# Patient Record
Sex: FEMALE | Race: BLACK OR AFRICAN AMERICAN | Employment: PART TIME | URBAN - METROPOLITAN AREA
[De-identification: names, ages, dates, MRNs, and addresses within clinical notes are randomized per-mention and may not be internally consistent; named-entity substitution may affect disease eponyms.]

---

## 2018-12-08 ENCOUNTER — HOSPITAL ENCOUNTER (EMERGENCY)
Age: 28
Discharge: HOME | End: 2018-12-08
Attending: EMERGENCY MEDICINE

## 2018-12-08 ENCOUNTER — APPOINTMENT (OUTPATIENT)
Dept: ULTRASOUND IMAGING | Age: 28
End: 2018-12-08

## 2018-12-08 VITALS
TEMPERATURE: 98.2 F | SYSTOLIC BLOOD PRESSURE: 114 MMHG | RESPIRATION RATE: 14 BRPM | DIASTOLIC BLOOD PRESSURE: 66 MMHG | HEIGHT: 66 IN | BODY MASS INDEX: 17.68 KG/M2 | OXYGEN SATURATION: 100 % | WEIGHT: 110 LBS | HEART RATE: 70 BPM

## 2018-12-08 DIAGNOSIS — K29.00 ACUTE GASTRITIS WITHOUT HEMORRHAGE, UNSPECIFIED GASTRITIS TYPE: Primary | ICD-10-CM

## 2018-12-08 DIAGNOSIS — O26.891 ABDOMINAL PAIN DURING PREGNANCY IN FIRST TRIMESTER: ICD-10-CM

## 2018-12-08 DIAGNOSIS — R10.9 ABDOMINAL PAIN DURING PREGNANCY IN FIRST TRIMESTER: ICD-10-CM

## 2018-12-08 LAB
ALBUMIN SERPL-MCNC: 4.6 G/DL (ref 3.5–5.2)
ALP BLD-CCNC: 84 U/L (ref 35–104)
ALT SERPL-CCNC: 10 U/L (ref 0–32)
ANION GAP SERPL CALCULATED.3IONS-SCNC: 20 MMOL/L (ref 7–16)
AST SERPL-CCNC: 22 U/L (ref 0–31)
BACTERIA: NORMAL /HPF
BASOPHILS ABSOLUTE: 0.02 E9/L (ref 0–0.2)
BASOPHILS RELATIVE PERCENT: 0.3 % (ref 0–2)
BILIRUB SERPL-MCNC: 0.4 MG/DL (ref 0–1.2)
BILIRUBIN URINE: NEGATIVE
BLOOD, URINE: ABNORMAL
BUN BLDV-MCNC: 15 MG/DL (ref 6–20)
CALCIUM SERPL-MCNC: 9.7 MG/DL (ref 8.6–10.2)
CHLORIDE BLD-SCNC: 94 MMOL/L (ref 98–107)
CHP ED QC CHECK: NORMAL
CLARITY: ABNORMAL
CO2: 19 MMOL/L (ref 22–29)
COLOR: YELLOW
CREAT SERPL-MCNC: 0.5 MG/DL (ref 0.5–1)
EOSINOPHILS ABSOLUTE: 0.01 E9/L (ref 0.05–0.5)
EOSINOPHILS RELATIVE PERCENT: 0.1 % (ref 0–6)
EPITHELIAL CELLS, UA: NORMAL /HPF
GFR AFRICAN AMERICAN: >60
GFR NON-AFRICAN AMERICAN: >60 ML/MIN/1.73
GLUCOSE BLD-MCNC: 64 MG/DL (ref 74–99)
GLUCOSE URINE: NEGATIVE MG/DL
GONADOTROPIN, CHORIONIC (HCG) QUANT: ABNORMAL MIU/ML
HCT VFR BLD CALC: 37 % (ref 34–48)
HEMOGLOBIN: 12.3 G/DL (ref 11.5–15.5)
IMMATURE GRANULOCYTES #: 0.02 E9/L
IMMATURE GRANULOCYTES %: 0.3 % (ref 0–5)
KETONES, URINE: >=80 MG/DL
LACTIC ACID: 2.2 MMOL/L (ref 0.5–2.2)
LEUKOCYTE ESTERASE, URINE: NEGATIVE
LIPASE: 21 U/L (ref 13–60)
LYMPHOCYTES ABSOLUTE: 1.83 E9/L (ref 1.5–4)
LYMPHOCYTES RELATIVE PERCENT: 27.2 % (ref 20–42)
MCH RBC QN AUTO: 27.2 PG (ref 26–35)
MCHC RBC AUTO-ENTMCNC: 33.2 % (ref 32–34.5)
MCV RBC AUTO: 81.7 FL (ref 80–99.9)
MONOCYTES ABSOLUTE: 0.42 E9/L (ref 0.1–0.95)
MONOCYTES RELATIVE PERCENT: 6.2 % (ref 2–12)
NEUTROPHILS ABSOLUTE: 4.43 E9/L (ref 1.8–7.3)
NEUTROPHILS RELATIVE PERCENT: 65.9 % (ref 43–80)
NITRITE, URINE: NEGATIVE
PDW BLD-RTO: 14.7 FL (ref 11.5–15)
PH UA: 6 (ref 5–9)
PLATELET # BLD: 333 E9/L (ref 130–450)
PMV BLD AUTO: 9.8 FL (ref 7–12)
POTASSIUM SERPL-SCNC: 3.7 MMOL/L (ref 3.5–5)
PREGNANCY TEST URINE, POC: POSITIVE
PROTEIN UA: ABNORMAL MG/DL
RBC # BLD: 4.53 E12/L (ref 3.5–5.5)
RBC UA: NORMAL /HPF (ref 0–2)
SODIUM BLD-SCNC: 133 MMOL/L (ref 132–146)
SPECIFIC GRAVITY UA: >=1.03 (ref 1–1.03)
TOTAL PROTEIN: 8.7 G/DL (ref 6.4–8.3)
TRICHOMONAS: NORMAL /HPF
UROBILINOGEN, URINE: 0.2 E.U./DL
WBC # BLD: 6.7 E9/L (ref 4.5–11.5)
WBC UA: NORMAL /HPF (ref 0–5)

## 2018-12-08 PROCEDURE — 85025 COMPLETE CBC W/AUTO DIFF WBC: CPT

## 2018-12-08 PROCEDURE — 6360000002 HC RX W HCPCS: Performed by: PREVENTIVE MEDICINE

## 2018-12-08 PROCEDURE — 83605 ASSAY OF LACTIC ACID: CPT

## 2018-12-08 PROCEDURE — 2580000003 HC RX 258: Performed by: PREVENTIVE MEDICINE

## 2018-12-08 PROCEDURE — 80053 COMPREHEN METABOLIC PANEL: CPT

## 2018-12-08 PROCEDURE — 6370000000 HC RX 637 (ALT 250 FOR IP): Performed by: PREVENTIVE MEDICINE

## 2018-12-08 PROCEDURE — 96375 TX/PRO/DX INJ NEW DRUG ADDON: CPT

## 2018-12-08 PROCEDURE — 76801 OB US < 14 WKS SINGLE FETUS: CPT

## 2018-12-08 PROCEDURE — 83690 ASSAY OF LIPASE: CPT

## 2018-12-08 PROCEDURE — 84702 CHORIONIC GONADOTROPIN TEST: CPT

## 2018-12-08 PROCEDURE — 96374 THER/PROPH/DIAG INJ IV PUSH: CPT

## 2018-12-08 PROCEDURE — S0028 INJECTION, FAMOTIDINE, 20 MG: HCPCS | Performed by: PREVENTIVE MEDICINE

## 2018-12-08 PROCEDURE — 99284 EMERGENCY DEPT VISIT MOD MDM: CPT

## 2018-12-08 PROCEDURE — 2500000003 HC RX 250 WO HCPCS: Performed by: PREVENTIVE MEDICINE

## 2018-12-08 PROCEDURE — 81001 URINALYSIS AUTO W/SCOPE: CPT

## 2018-12-08 RX ORDER — 0.9 % SODIUM CHLORIDE 0.9 %
1000 INTRAVENOUS SOLUTION INTRAVENOUS ONCE
Status: COMPLETED | OUTPATIENT
Start: 2018-12-08 | End: 2018-12-08

## 2018-12-08 RX ORDER — DEXTROSE MONOHYDRATE 25 G/50ML
25 INJECTION, SOLUTION INTRAVENOUS ONCE
Status: COMPLETED | OUTPATIENT
Start: 2018-12-08 | End: 2018-12-08

## 2018-12-08 RX ORDER — PRENATAL NO.42/FOLIC ACID 1.4 MG
1 TABLET CHEW,IMMED AND DELAY REL,BIPHASE ORAL DAILY
Qty: 30 TABLET | Refills: 0 | Status: SHIPPED | OUTPATIENT
Start: 2018-12-08 | End: 2019-01-07

## 2018-12-08 RX ORDER — METOCLOPRAMIDE HYDROCHLORIDE 5 MG/ML
10 INJECTION INTRAMUSCULAR; INTRAVENOUS ONCE
Status: COMPLETED | OUTPATIENT
Start: 2018-12-08 | End: 2018-12-08

## 2018-12-08 RX ADMIN — DEXTROSE MONOHYDRATE 25 G: 25 INJECTION, SOLUTION INTRAVENOUS at 16:05

## 2018-12-08 RX ADMIN — SODIUM CHLORIDE 1000 ML: 9 INJECTION, SOLUTION INTRAVENOUS at 16:05

## 2018-12-08 RX ADMIN — FAMOTIDINE 20 MG: 10 INJECTION, SOLUTION INTRAVENOUS at 14:52

## 2018-12-08 RX ADMIN — METOCLOPRAMIDE 10 MG: 5 INJECTION, SOLUTION INTRAMUSCULAR; INTRAVENOUS at 14:52

## 2018-12-08 RX ADMIN — LIDOCAINE HYDROCHLORIDE: 20 SOLUTION ORAL; TOPICAL at 14:52

## 2018-12-08 ASSESSMENT — ENCOUNTER SYMPTOMS
VOMITING: 1
SHORTNESS OF BREATH: 0
COUGH: 0
CONSTIPATION: 0
ALLERGIC/IMMUNOLOGIC NEGATIVE: 1
ABDOMINAL PAIN: 1
CHEST TIGHTNESS: 0
DIARRHEA: 0
NAUSEA: 1

## 2018-12-08 ASSESSMENT — PAIN DESCRIPTION - PAIN TYPE: TYPE: ACUTE PAIN

## 2018-12-08 ASSESSMENT — PAIN SCALES - GENERAL: PAINLEVEL_OUTOF10: 10

## 2018-12-08 ASSESSMENT — PAIN DESCRIPTION - LOCATION: LOCATION: ABDOMEN

## 2018-12-08 ASSESSMENT — PAIN DESCRIPTION - DESCRIPTORS: DESCRIPTORS: BURNING

## 2018-12-08 NOTE — ED PROVIDER NOTES
pregnancy in first trimester        Disposition:  Patient's disposition: Discharge to home  Patient's condition is stable.                   Sagrario Madera, DO  Resident  12/09/18 [id number]

## 2019-01-08 ENCOUNTER — HOSPITAL ENCOUNTER (EMERGENCY)
Age: 29
Discharge: HOME | End: 2019-01-08
Attending: EMERGENCY MEDICINE
Payer: MEDICAID

## 2019-01-08 ENCOUNTER — APPOINTMENT (OUTPATIENT)
Dept: ULTRASOUND IMAGING | Age: 29
End: 2019-01-08
Payer: MEDICAID

## 2019-01-08 VITALS
BODY MASS INDEX: 17.68 KG/M2 | DIASTOLIC BLOOD PRESSURE: 71 MMHG | SYSTOLIC BLOOD PRESSURE: 117 MMHG | WEIGHT: 110 LBS | HEIGHT: 66 IN | HEART RATE: 80 BPM | RESPIRATION RATE: 14 BRPM | TEMPERATURE: 98.6 F | OXYGEN SATURATION: 100 %

## 2019-01-08 DIAGNOSIS — O03.9 SPONTANEOUS ABORTION: Primary | ICD-10-CM

## 2019-01-08 LAB
ABO/RH: NORMAL
ALBUMIN SERPL-MCNC: 3.4 G/DL (ref 3.5–5.2)
ALP BLD-CCNC: 64 U/L (ref 35–104)
ALT SERPL-CCNC: 8 U/L (ref 0–32)
ANION GAP SERPL CALCULATED.3IONS-SCNC: 8 MMOL/L (ref 7–16)
AST SERPL-CCNC: 17 U/L (ref 0–31)
BASOPHILS ABSOLUTE: 0.02 E9/L (ref 0–0.2)
BASOPHILS RELATIVE PERCENT: 0.3 % (ref 0–2)
BILIRUB SERPL-MCNC: 0.2 MG/DL (ref 0–1.2)
BUN BLDV-MCNC: 8 MG/DL (ref 6–20)
CALCIUM SERPL-MCNC: 8.6 MG/DL (ref 8.6–10.2)
CHLORIDE BLD-SCNC: 102 MMOL/L (ref 98–107)
CO2: 22 MMOL/L (ref 22–29)
CREAT SERPL-MCNC: 0.5 MG/DL (ref 0.5–1)
EOSINOPHILS ABSOLUTE: 0.02 E9/L (ref 0.05–0.5)
EOSINOPHILS RELATIVE PERCENT: 0.3 % (ref 0–6)
GFR AFRICAN AMERICAN: >60
GFR NON-AFRICAN AMERICAN: >60 ML/MIN/1.73
GLUCOSE BLD-MCNC: 80 MG/DL (ref 74–99)
HCT VFR BLD CALC: 29.2 % (ref 34–48)
HEMOGLOBIN: 10.2 G/DL (ref 11.5–15.5)
IMMATURE GRANULOCYTES #: 0.03 E9/L
IMMATURE GRANULOCYTES %: 0.4 % (ref 0–5)
LYMPHOCYTES ABSOLUTE: 1.31 E9/L (ref 1.5–4)
LYMPHOCYTES RELATIVE PERCENT: 18.3 % (ref 20–42)
MCH RBC QN AUTO: 28.6 PG (ref 26–35)
MCHC RBC AUTO-ENTMCNC: 34.9 % (ref 32–34.5)
MCV RBC AUTO: 81.8 FL (ref 80–99.9)
MONOCYTES ABSOLUTE: 0.38 E9/L (ref 0.1–0.95)
MONOCYTES RELATIVE PERCENT: 5.3 % (ref 2–12)
NEUTROPHILS ABSOLUTE: 5.39 E9/L (ref 1.8–7.3)
NEUTROPHILS RELATIVE PERCENT: 75.4 % (ref 43–80)
PDW BLD-RTO: 14.7 FL (ref 11.5–15)
PLATELET # BLD: 254 E9/L (ref 130–450)
PMV BLD AUTO: 9.9 FL (ref 7–12)
POTASSIUM SERPL-SCNC: 3.2 MMOL/L (ref 3.5–5)
RBC # BLD: 3.57 E12/L (ref 3.5–5.5)
SODIUM BLD-SCNC: 132 MMOL/L (ref 132–146)
TOTAL PROTEIN: 6.9 G/DL (ref 6.4–8.3)
WBC # BLD: 7.2 E9/L (ref 4.5–11.5)

## 2019-01-08 PROCEDURE — 99284 EMERGENCY DEPT VISIT MOD MDM: CPT

## 2019-01-08 PROCEDURE — 88300 SURGICAL PATH GROSS: CPT

## 2019-01-08 PROCEDURE — 86900 BLOOD TYPING SEROLOGIC ABO: CPT

## 2019-01-08 PROCEDURE — 80053 COMPREHEN METABOLIC PANEL: CPT

## 2019-01-08 PROCEDURE — 85025 COMPLETE CBC W/AUTO DIFF WBC: CPT

## 2019-01-08 PROCEDURE — 76801 OB US < 14 WKS SINGLE FETUS: CPT

## 2019-01-08 PROCEDURE — 86901 BLOOD TYPING SEROLOGIC RH(D): CPT

## 2019-01-08 RX ORDER — NAPROXEN 500 MG/1
500 TABLET ORAL 2 TIMES DAILY
Qty: 14 TABLET | Refills: 0 | Status: SHIPPED | OUTPATIENT
Start: 2019-01-08 | End: 2019-12-11

## 2019-01-08 RX ORDER — ONDANSETRON 8 MG/1
8 TABLET, ORALLY DISINTEGRATING ORAL EVERY 8 HOURS PRN
Qty: 10 TABLET | Refills: 1 | Status: SHIPPED | OUTPATIENT
Start: 2019-01-08 | End: 2019-12-11

## 2019-01-08 RX ORDER — 0.9 % SODIUM CHLORIDE 0.9 %
500 INTRAVENOUS SOLUTION INTRAVENOUS ONCE
Status: DISCONTINUED | OUTPATIENT
Start: 2019-01-08 | End: 2019-01-09 | Stop reason: HOSPADM

## 2019-01-08 RX ORDER — OXYCODONE HYDROCHLORIDE AND ACETAMINOPHEN 5; 325 MG/1; MG/1
1 TABLET ORAL EVERY 6 HOURS PRN
Qty: 6 TABLET | Refills: 0 | Status: SHIPPED | OUTPATIENT
Start: 2019-01-08 | End: 2019-01-11

## 2019-01-14 ENCOUNTER — HOSPITAL ENCOUNTER (EMERGENCY)
Age: 29
Discharge: HOME | End: 2019-01-14
Attending: EMERGENCY MEDICINE
Payer: MEDICAID

## 2019-01-14 ENCOUNTER — APPOINTMENT (OUTPATIENT)
Dept: ULTRASOUND IMAGING | Age: 29
End: 2019-01-14
Payer: MEDICAID

## 2019-01-14 VITALS
RESPIRATION RATE: 16 BRPM | TEMPERATURE: 98.3 F | OXYGEN SATURATION: 96 % | HEIGHT: 62 IN | BODY MASS INDEX: 22.45 KG/M2 | HEART RATE: 90 BPM | WEIGHT: 122 LBS | DIASTOLIC BLOOD PRESSURE: 69 MMHG | SYSTOLIC BLOOD PRESSURE: 104 MMHG

## 2019-01-14 DIAGNOSIS — O03.4 INCOMPLETE ABORTION: Primary | ICD-10-CM

## 2019-01-14 LAB
ABO/RH: NORMAL
ANION GAP SERPL CALCULATED.3IONS-SCNC: 13 MMOL/L (ref 7–16)
ANTIBODY SCREEN: NORMAL
APTT: 28.4 SEC (ref 24.5–35.1)
BACTERIA: ABNORMAL /HPF
BILIRUBIN URINE: ABNORMAL
BLOOD, URINE: ABNORMAL
BUN BLDV-MCNC: 8 MG/DL (ref 6–20)
CALCIUM SERPL-MCNC: 8.9 MG/DL (ref 8.6–10.2)
CHLORIDE BLD-SCNC: 101 MMOL/L (ref 98–107)
CLARITY: ABNORMAL
CO2: 24 MMOL/L (ref 22–29)
COLOR: ABNORMAL
CREAT SERPL-MCNC: 0.5 MG/DL (ref 0.5–1)
GFR AFRICAN AMERICAN: >60
GFR NON-AFRICAN AMERICAN: >60 ML/MIN/1.73
GLUCOSE BLD-MCNC: 109 MG/DL (ref 74–99)
GLUCOSE URINE: NEGATIVE MG/DL
GONADOTROPIN, CHORIONIC (HCG) QUANT: 2029 MIU/ML
HCT VFR BLD CALC: 28.7 % (ref 34–48)
HEMOGLOBIN: 9.7 G/DL (ref 11.5–15.5)
INR BLD: 1.3
KETONES, URINE: >=80 MG/DL
LEUKOCYTE ESTERASE, URINE: ABNORMAL
MCH RBC QN AUTO: 28 PG (ref 26–35)
MCHC RBC AUTO-ENTMCNC: 33.8 % (ref 32–34.5)
MCV RBC AUTO: 82.7 FL (ref 80–99.9)
NITRITE, URINE: POSITIVE
PDW BLD-RTO: 15.1 FL (ref 11.5–15)
PH UA: 6.5 (ref 5–9)
PLATELET # BLD: 274 E9/L (ref 130–450)
PMV BLD AUTO: 9.7 FL (ref 7–12)
POTASSIUM SERPL-SCNC: 3 MMOL/L (ref 3.5–5)
PROTEIN UA: 100 MG/DL
PROTHROMBIN TIME: 15.1 SEC (ref 9.3–12.4)
RBC # BLD: 3.47 E12/L (ref 3.5–5.5)
RBC UA: >20 /HPF (ref 0–2)
SODIUM BLD-SCNC: 138 MMOL/L (ref 132–146)
SPECIFIC GRAVITY UA: 1.02 (ref 1–1.03)
UROBILINOGEN, URINE: 2 E.U./DL
WBC # BLD: 16.8 E9/L (ref 4.5–11.5)
WBC UA: ABNORMAL /HPF (ref 0–5)

## 2019-01-14 PROCEDURE — 80048 BASIC METABOLIC PNL TOTAL CA: CPT

## 2019-01-14 PROCEDURE — 84702 CHORIONIC GONADOTROPIN TEST: CPT

## 2019-01-14 PROCEDURE — 86900 BLOOD TYPING SEROLOGIC ABO: CPT

## 2019-01-14 PROCEDURE — 86901 BLOOD TYPING SEROLOGIC RH(D): CPT

## 2019-01-14 PROCEDURE — 85027 COMPLETE CBC AUTOMATED: CPT

## 2019-01-14 PROCEDURE — 85610 PROTHROMBIN TIME: CPT

## 2019-01-14 PROCEDURE — 85730 THROMBOPLASTIN TIME PARTIAL: CPT

## 2019-01-14 PROCEDURE — 87088 URINE BACTERIA CULTURE: CPT

## 2019-01-14 PROCEDURE — 76856 US EXAM PELVIC COMPLETE: CPT

## 2019-01-14 PROCEDURE — 86850 RBC ANTIBODY SCREEN: CPT

## 2019-01-14 PROCEDURE — 99284 EMERGENCY DEPT VISIT MOD MDM: CPT

## 2019-01-14 PROCEDURE — 6370000000 HC RX 637 (ALT 250 FOR IP): Performed by: EMERGENCY MEDICINE

## 2019-01-14 PROCEDURE — 36415 COLL VENOUS BLD VENIPUNCTURE: CPT

## 2019-01-14 PROCEDURE — 81001 URINALYSIS AUTO W/SCOPE: CPT

## 2019-01-14 RX ORDER — POTASSIUM CHLORIDE 1.5 G/1.77G
40 POWDER, FOR SOLUTION ORAL ONCE
Status: COMPLETED | OUTPATIENT
Start: 2019-01-14 | End: 2019-01-14

## 2019-01-14 RX ADMIN — POTASSIUM CHLORIDE 40 MEQ: 1.5 POWDER, FOR SOLUTION ORAL at 13:30

## 2019-01-14 ASSESSMENT — PAIN SCALES - GENERAL: PAINLEVEL_OUTOF10: 4

## 2019-01-15 LAB — URINE CULTURE, ROUTINE: NORMAL

## 2019-12-11 ENCOUNTER — HOSPITAL ENCOUNTER (OUTPATIENT)
Age: 29
Discharge: HOME | End: 2019-12-13
Payer: MEDICAID

## 2019-12-11 DIAGNOSIS — N96 HISTORY OF MULTIPLE MISCARRIAGES: ICD-10-CM

## 2019-12-11 LAB
AT-III ACTIVITY: 80 % ACTIVITY (ref 83–121)
GONADOTROPIN, CHORIONIC (HCG) QUANT: 0.5 MIU/ML
HOMOCYSTEINE: 7.4 UMOL/L (ref 0–15)
LUPUS ANTICOAG DVVT: NORMAL
PROTEIN C ACTIVITY: 51 % ACTIVITY (ref 68–165)
T3 FREE: 3 PG/ML (ref 2–4.4)
T4 FREE: 1.1 NG/DL (ref 0.93–1.7)
TSH SERPL DL<=0.05 MIU/L-ACNC: 1.82 UIU/ML (ref 0.27–4.2)
VITAMIN D 25-HYDROXY: 13 NG/ML (ref 30–100)

## 2019-12-11 PROCEDURE — 86146 BETA-2 GLYCOPROTEIN ANTIBODY: CPT

## 2019-12-11 PROCEDURE — 84443 ASSAY THYROID STIM HORMONE: CPT

## 2019-12-11 PROCEDURE — 87591 N.GONORRHOEAE DNA AMP PROB: CPT

## 2019-12-11 PROCEDURE — 85613 RUSSELL VIPER VENOM DILUTED: CPT

## 2019-12-11 PROCEDURE — 83090 ASSAY OF HOMOCYSTEINE: CPT

## 2019-12-11 PROCEDURE — 85305 CLOT INHIBIT PROT S TOTAL: CPT

## 2019-12-11 PROCEDURE — 87624 HPV HI-RISK TYP POOLED RSLT: CPT

## 2019-12-11 PROCEDURE — 85300 ANTITHROMBIN III ACTIVITY: CPT

## 2019-12-11 PROCEDURE — 86147 CARDIOLIPIN ANTIBODY EA IG: CPT

## 2019-12-11 PROCEDURE — 87491 CHLMYD TRACH DNA AMP PROBE: CPT

## 2019-12-11 PROCEDURE — 88175 CYTOPATH C/V AUTO FLUID REDO: CPT

## 2019-12-11 PROCEDURE — 84481 FREE ASSAY (FT-3): CPT

## 2019-12-11 PROCEDURE — 85303 CLOT INHIBIT PROT C ACTIVITY: CPT

## 2019-12-11 PROCEDURE — 82306 VITAMIN D 25 HYDROXY: CPT

## 2019-12-11 PROCEDURE — 84439 ASSAY OF FREE THYROXINE: CPT

## 2019-12-11 PROCEDURE — 84702 CHORIONIC GONADOTROPIN TEST: CPT

## 2019-12-13 LAB
ANTICARDIOLIPIN IGA ANTIBODY: 0 APL (ref 0–11)
ANTICARDIOLIPIN IGG ANTIBODY: 5 GPL (ref 0–14)
CARDIOLIPIN AB IGM: 20 MPL (ref 0–12)
PROTEIN S, FUNCTIONAL: 75 % (ref 57–131)

## 2019-12-14 LAB — BETA-2 GLYCOPROTEIN 1 IGA ANTIBODY: 4 SAU (ref 0–20)

## 2019-12-15 LAB
CHLAMYDIA BY THIN PREP: NEGATIVE
N. GONORRHOEAE DNA, THIN PREP: NEGATIVE
SOURCE: NORMAL

## 2019-12-30 ENCOUNTER — HOSPITAL ENCOUNTER (OUTPATIENT)
Age: 29
Discharge: HOME | End: 2020-01-01
Payer: MEDICAID

## 2019-12-30 PROCEDURE — 81400 MOPATH PROCEDURE LEVEL 1: CPT

## 2019-12-30 PROCEDURE — 81291 MTHFR GENE: CPT

## 2019-12-30 PROCEDURE — 81241 F5 GENE: CPT

## 2019-12-30 PROCEDURE — 81240 F2 GENE: CPT

## 2020-01-03 LAB — THROMBOPHILIA DNA ASSAY: NORMAL

## 2020-01-07 LAB
HPV SAMPLE: NORMAL
HPV TYPE 16: NOT DETECTED
HPV TYPE 18: NOT DETECTED
HPV, HIGH RISK OTHER: NOT DETECTED
INTERPRETATION: NORMAL
SOURCE: NORMAL

## 2020-05-13 ENCOUNTER — HOSPITAL ENCOUNTER (OUTPATIENT)
Age: 30
Discharge: HOME | End: 2020-05-15
Payer: MEDICAID

## 2020-05-13 LAB
BACTERIA: ABNORMAL /HPF
BILIRUBIN URINE: NEGATIVE
BLOOD, URINE: NEGATIVE
CLARITY: ABNORMAL
COLOR: YELLOW
EPITHELIAL CELLS, UA: ABNORMAL /HPF
GLUCOSE URINE: NEGATIVE MG/DL
KETONES, URINE: NEGATIVE MG/DL
LEUKOCYTE ESTERASE, URINE: ABNORMAL
NITRITE, URINE: NEGATIVE
PH UA: 8.5 (ref 5–9)
PROTEIN UA: 30 MG/DL
RBC UA: ABNORMAL /HPF (ref 0–2)
SPECIFIC GRAVITY UA: 1.01 (ref 1–1.03)
UROBILINOGEN, URINE: 0.2 E.U./DL
WBC UA: ABNORMAL /HPF (ref 0–5)

## 2020-05-13 PROCEDURE — 87088 URINE BACTERIA CULTURE: CPT

## 2020-05-13 PROCEDURE — 87591 N.GONORRHOEAE DNA AMP PROB: CPT

## 2020-05-13 PROCEDURE — 81001 URINALYSIS AUTO W/SCOPE: CPT

## 2020-05-13 PROCEDURE — 87491 CHLMYD TRACH DNA AMP PROBE: CPT

## 2020-05-14 PROBLEM — O21.9 NAUSEA AND VOMITING DURING PREGNANCY PRIOR TO 22 WEEKS GESTATION: Status: ACTIVE | Noted: 2020-05-14

## 2020-05-15 LAB — URINE CULTURE, ROUTINE: NORMAL

## 2020-05-18 LAB
C. TRACHOMATIS DNA ,URINE: NEGATIVE
N. GONORRHOEAE DNA, URINE: NEGATIVE
SOURCE: NORMAL

## 2020-05-20 ENCOUNTER — HOSPITAL ENCOUNTER (OUTPATIENT)
Age: 30
Discharge: HOME | End: 2020-05-22
Payer: MEDICAID

## 2020-05-20 LAB
HBA1C MFR BLD: 5.1 % (ref 4–5.6)
HCT VFR BLD CALC: 32.9 % (ref 34–48)
HEMOGLOBIN: 10.3 G/DL (ref 11.5–15.5)
MCH RBC QN AUTO: 24.7 PG (ref 26–35)
MCHC RBC AUTO-ENTMCNC: 31.3 % (ref 32–34.5)
MCV RBC AUTO: 78.9 FL (ref 80–99.9)
PDW BLD-RTO: 17.2 FL (ref 11.5–15)
PLATELET # BLD: 358 E9/L (ref 130–450)
PMV BLD AUTO: 10.4 FL (ref 7–12)
RBC # BLD: 4.17 E12/L (ref 3.5–5.5)
TSH SERPL DL<=0.05 MIU/L-ACNC: 0.99 UIU/ML (ref 0.27–4.2)
WBC # BLD: 8.1 E9/L (ref 4.5–11.5)

## 2020-05-20 PROCEDURE — 86787 VARICELLA-ZOSTER ANTIBODY: CPT

## 2020-05-20 PROCEDURE — 84443 ASSAY THYROID STIM HORMONE: CPT

## 2020-05-20 PROCEDURE — 86901 BLOOD TYPING SEROLOGIC RH(D): CPT

## 2020-05-20 PROCEDURE — 85027 COMPLETE CBC AUTOMATED: CPT

## 2020-05-20 PROCEDURE — 83036 HEMOGLOBIN GLYCOSYLATED A1C: CPT

## 2020-05-20 PROCEDURE — 86777 TOXOPLASMA ANTIBODY: CPT

## 2020-05-20 PROCEDURE — 86850 RBC ANTIBODY SCREEN: CPT

## 2020-05-20 PROCEDURE — 86778 TOXOPLASMA ANTIBODY IGM: CPT

## 2020-05-20 PROCEDURE — 86703 HIV-1/HIV-2 1 RESULT ANTBDY: CPT

## 2020-05-20 PROCEDURE — 86803 HEPATITIS C AB TEST: CPT

## 2020-05-20 PROCEDURE — 86592 SYPHILIS TEST NON-TREP QUAL: CPT

## 2020-05-20 PROCEDURE — 86762 RUBELLA ANTIBODY: CPT

## 2020-05-20 PROCEDURE — 86900 BLOOD TYPING SEROLOGIC ABO: CPT

## 2020-05-20 PROCEDURE — 87340 HEPATITIS B SURFACE AG IA: CPT

## 2020-05-21 LAB
ABO/RH: NORMAL
ANTIBODY SCREEN: NORMAL
HEPATITIS B SURFACE ANTIGEN INTERPRETATION: NORMAL
HEPATITIS C ANTIBODY INTERPRETATION: NORMAL
HIV-1 AND HIV-2 ANTIBODIES: NORMAL
RPR: NORMAL
RUBELLA ANTIBODY IGG: NORMAL
TOXOPLASMA IGM ANTIBODY: NORMAL
TOXOPLASMOSIS IGG AB: NORMAL
VARICELLA-ZOSTER VIRUS AB, IGG: NORMAL

## 2020-07-08 ENCOUNTER — HOSPITAL ENCOUNTER (OUTPATIENT)
Age: 30
Discharge: HOME | End: 2020-07-10
Payer: MEDICAID

## 2020-07-08 PROCEDURE — 82105 ALPHA-FETOPROTEIN SERUM: CPT

## 2020-07-11 LAB
AFP INTERPRETATION: NORMAL
AFP MOM: 1.27
AFP SPECIMEN: NORMAL
DATING: NORMAL
ESTIMATED DUE DATE: NORMAL
FETUS COUNT: NORMAL
GESTATIONAL AGE CALC AT COLLECT: NORMAL
HISTORY/NEURAL TUBE DEFECTS: NO
INSULIN DEP. DIABETIC: NO
MATERNAL AGE AT EDD: 32.7 YR
MATERNAL WEIGHT: NORMAL
PT AFP: 58 NG/ML
RACE: NORMAL
SMOKING: NO

## 2020-08-11 ENCOUNTER — ROUTINE PRENATAL (OUTPATIENT)
Dept: OBGYN CLINIC | Age: 30
End: 2020-08-11
Payer: MEDICAID

## 2020-08-11 VITALS
DIASTOLIC BLOOD PRESSURE: 70 MMHG | TEMPERATURE: 97.2 F | HEART RATE: 74 BPM | WEIGHT: 137 LBS | BODY MASS INDEX: 25.06 KG/M2 | SYSTOLIC BLOOD PRESSURE: 108 MMHG

## 2020-08-11 LAB
GLUCOSE URINE, POC: NEGATIVE
PROTEIN UA: NEGATIVE

## 2020-08-11 PROCEDURE — G8419 CALC BMI OUT NRM PARAM NOF/U: HCPCS | Performed by: OBSTETRICS & GYNECOLOGY

## 2020-08-11 PROCEDURE — 81002 URINALYSIS NONAUTO W/O SCOPE: CPT | Performed by: OBSTETRICS & GYNECOLOGY

## 2020-08-11 PROCEDURE — 99202 OFFICE O/P NEW SF 15 MIN: CPT | Performed by: OBSTETRICS & GYNECOLOGY

## 2020-08-11 PROCEDURE — 99203 OFFICE O/P NEW LOW 30 MIN: CPT | Performed by: OBSTETRICS & GYNECOLOGY

## 2020-08-11 PROCEDURE — 76817 TRANSVAGINAL US OBSTETRIC: CPT | Performed by: OBSTETRICS & GYNECOLOGY

## 2020-08-11 PROCEDURE — 76811 OB US DETAILED SNGL FETUS: CPT | Performed by: OBSTETRICS & GYNECOLOGY

## 2020-08-11 PROCEDURE — G8427 DOCREV CUR MEDS BY ELIG CLIN: HCPCS | Performed by: OBSTETRICS & GYNECOLOGY

## 2020-09-25 ENCOUNTER — HOSPITAL ENCOUNTER (OUTPATIENT)
Age: 30
Discharge: HOME | End: 2020-09-27
Payer: MEDICAID

## 2020-09-25 LAB
GLUCOSE TOLERANCE TEST 1 HOUR: 102 MG/DL
GLUCOSE TOLERANCE TEST 2 HOUR: 80 MG/DL
GLUCOSE TOLERANCE TEST FASTING: 80 MG/DL
HCT VFR BLD CALC: 27.1 % (ref 34–48)
HEMOGLOBIN: 7.9 G/DL (ref 11.5–15.5)
MCH RBC QN AUTO: 22.3 PG (ref 26–35)
MCHC RBC AUTO-ENTMCNC: 29.2 % (ref 32–34.5)
MCV RBC AUTO: 76.3 FL (ref 80–99.9)
PDW BLD-RTO: 14.7 FL (ref 11.5–15)
PLATELET # BLD: 295 E9/L (ref 130–450)
PMV BLD AUTO: 10 FL (ref 7–12)
RBC # BLD: 3.55 E12/L (ref 3.5–5.5)
WBC # BLD: 7.2 E9/L (ref 4.5–11.5)

## 2020-09-25 PROCEDURE — 86900 BLOOD TYPING SEROLOGIC ABO: CPT

## 2020-09-25 PROCEDURE — 86850 RBC ANTIBODY SCREEN: CPT

## 2020-09-25 PROCEDURE — 86901 BLOOD TYPING SEROLOGIC RH(D): CPT

## 2020-09-25 PROCEDURE — 82951 GLUCOSE TOLERANCE TEST (GTT): CPT

## 2020-09-25 PROCEDURE — 85027 COMPLETE CBC AUTOMATED: CPT

## 2020-09-26 LAB
ABO/RH: NORMAL
ANTIBODY SCREEN: NORMAL

## 2020-10-09 ENCOUNTER — HOSPITAL ENCOUNTER (OUTPATIENT)
Age: 30
Discharge: HOME | End: 2020-10-11
Payer: MEDICAID

## 2020-10-09 LAB
HCT VFR BLD CALC: 28.7 % (ref 34–48)
HEMOGLOBIN: 8.3 G/DL (ref 11.5–15.5)
IRON SATURATION: 6 % (ref 15–50)
IRON: 35 MCG/DL (ref 37–145)
MCH RBC QN AUTO: 21.8 PG (ref 26–35)
MCHC RBC AUTO-ENTMCNC: 28.9 % (ref 32–34.5)
MCV RBC AUTO: 75.3 FL (ref 80–99.9)
PDW BLD-RTO: 15.4 FL (ref 11.5–15)
PLATELET # BLD: 349 E9/L (ref 130–450)
PMV BLD AUTO: 10.1 FL (ref 7–12)
RBC # BLD: 3.81 E12/L (ref 3.5–5.5)
TOTAL IRON BINDING CAPACITY: 564 MCG/DL (ref 250–450)
WBC # BLD: 9.2 E9/L (ref 4.5–11.5)

## 2020-10-09 PROCEDURE — 83540 ASSAY OF IRON: CPT

## 2020-10-09 PROCEDURE — 85027 COMPLETE CBC AUTOMATED: CPT

## 2020-10-09 PROCEDURE — 83550 IRON BINDING TEST: CPT

## 2020-10-23 ENCOUNTER — HOSPITAL ENCOUNTER (OUTPATIENT)
Age: 30
Discharge: HOME | End: 2020-10-25
Payer: MEDICAID

## 2020-10-23 PROCEDURE — 87088 URINE BACTERIA CULTURE: CPT

## 2020-10-26 LAB — URINE CULTURE, ROUTINE: NORMAL

## 2020-11-06 DIAGNOSIS — O09.43 HIGH RISK MULTIGRAVIDA, THIRD TRIMESTER: ICD-10-CM

## 2020-11-06 LAB
HCT VFR BLD CALC: 25.9 % (ref 34–48)
HEMOGLOBIN: 7.3 G/DL (ref 11.5–15.5)
MCH RBC QN AUTO: 20.3 PG (ref 26–35)
MCHC RBC AUTO-ENTMCNC: 28.2 % (ref 32–34.5)
MCV RBC AUTO: 72.1 FL (ref 80–99.9)
PDW BLD-RTO: 17.2 FL (ref 11.5–15)
PLATELET # BLD: 370 E9/L (ref 130–450)
PMV BLD AUTO: 9.8 FL (ref 7–12)
RBC # BLD: 3.59 E12/L (ref 3.5–5.5)
WBC # BLD: 10.5 E9/L (ref 4.5–11.5)

## 2020-11-09 LAB — GROUP B STREP CULTURE: NORMAL

## 2020-11-12 LAB
C TRACH DNA GENITAL QL NAA+PROBE: ABNORMAL
N. GONORRHOEAE DNA: ABNORMAL
SOURCE: ABNORMAL

## 2020-12-09 ENCOUNTER — APPOINTMENT (OUTPATIENT)
Dept: LABOR AND DELIVERY | Age: 30
DRG: 560 | End: 2020-12-09
Payer: MEDICAID

## 2020-12-09 ENCOUNTER — ANESTHESIA (OUTPATIENT)
Dept: LABOR AND DELIVERY | Age: 30
DRG: 560 | End: 2020-12-09
Payer: MEDICAID

## 2020-12-09 ENCOUNTER — ANESTHESIA EVENT (OUTPATIENT)
Dept: LABOR AND DELIVERY | Age: 30
DRG: 560 | End: 2020-12-09
Payer: MEDICAID

## 2020-12-09 ENCOUNTER — HOSPITAL ENCOUNTER (INPATIENT)
Age: 30
LOS: 3 days | Discharge: HOME | DRG: 560 | End: 2020-12-12
Attending: OBSTETRICS & GYNECOLOGY | Admitting: OBSTETRICS & GYNECOLOGY
Payer: MEDICAID

## 2020-12-09 PROBLEM — R76.0 ANTICARDIOLIPIN ANTIBODY AFFECTING PREGNANCY, ANTEPARTUM: Status: ACTIVE | Noted: 2020-12-09

## 2020-12-09 PROBLEM — O99.113 THROMBOPHILIA AFFECTING PREGNANCY IN THIRD TRIMESTER, ANTEPARTUM (HCC): Status: ACTIVE | Noted: 2020-12-09

## 2020-12-09 PROBLEM — Z3A.39 39 WEEKS GESTATION OF PREGNANCY: Status: ACTIVE | Noted: 2020-12-09

## 2020-12-09 PROBLEM — D68.59 PROTEIN C DEFICIENCY AFFECTING PREGNANCY (HCC): Status: ACTIVE | Noted: 2020-12-09

## 2020-12-09 PROBLEM — O99.119 MATERNAL ANTITHROMBIN III DEFICIENCY COMPLICATING PREGNANCY (HCC): Status: ACTIVE | Noted: 2020-12-09

## 2020-12-09 PROBLEM — O35.BXX0 ECHOGENIC FOCUS OF HEART OF FETUS AFFECTING ANTEPARTUM CARE OF MOTHER: Status: ACTIVE | Noted: 2020-12-09

## 2020-12-09 PROBLEM — D68.59 THROMBOPHILIA AFFECTING PREGNANCY IN THIRD TRIMESTER, ANTEPARTUM (HCC): Status: ACTIVE | Noted: 2020-12-09

## 2020-12-09 PROBLEM — Z79.01: Status: ACTIVE | Noted: 2020-12-09

## 2020-12-09 PROBLEM — Z15.89 MTHFR MUTATION: Status: ACTIVE | Noted: 2020-12-09

## 2020-12-09 PROBLEM — O99.891 ANTICARDIOLIPIN ANTIBODY AFFECTING PREGNANCY, ANTEPARTUM: Status: ACTIVE | Noted: 2020-12-09

## 2020-12-09 PROBLEM — O99.119 PROTEIN C DEFICIENCY AFFECTING PREGNANCY (HCC): Status: ACTIVE | Noted: 2020-12-09

## 2020-12-09 PROBLEM — D68.59 MATERNAL ANTITHROMBIN III DEFICIENCY COMPLICATING PREGNANCY (HCC): Status: ACTIVE | Noted: 2020-12-09

## 2020-12-09 PROBLEM — O09.43 HIGH RISK MULTIGRAVIDA, THIRD TRIMESTER: Status: ACTIVE | Noted: 2020-12-09

## 2020-12-09 PROBLEM — O99.013 ANEMIA AFFECTING PREGNANCY IN THIRD TRIMESTER: Status: ACTIVE | Noted: 2020-12-09

## 2020-12-09 PROBLEM — O26.23 HISTORY OF RECURRENT ABORTION, CURRENTLY PREGNANT IN THIRD TRIMESTER: Status: ACTIVE | Noted: 2020-12-09

## 2020-12-09 LAB
ABO/RH: NORMAL
AMPHETAMINE SCREEN, URINE: NOT DETECTED
ANTIBODY SCREEN: NORMAL
APTT: 31.3 SEC (ref 24.5–35.1)
BARBITURATE SCREEN URINE: NOT DETECTED
BENZODIAZEPINE SCREEN, URINE: NOT DETECTED
CANNABINOID SCREEN URINE: NOT DETECTED
COCAINE METABOLITE SCREEN URINE: NOT DETECTED
FENTANYL SCREEN, URINE: NOT DETECTED
HCT VFR BLD CALC: 25.9 % (ref 34–48)
HCT VFR BLD CALC: 26.7 % (ref 34–48)
HEMOGLOBIN: 7.3 G/DL (ref 11.5–15.5)
HEMOGLOBIN: 7.8 G/DL (ref 11.5–15.5)
INR BLD: 0.9
Lab: NORMAL
MCH RBC QN AUTO: 20.4 PG (ref 26–35)
MCHC RBC AUTO-ENTMCNC: 29.2 % (ref 32–34.5)
MCV RBC AUTO: 69.7 FL (ref 80–99.9)
METHADONE SCREEN, URINE: NOT DETECTED
OPIATE SCREEN URINE: NOT DETECTED
OXYCODONE URINE: NOT DETECTED
PDW BLD-RTO: 21.2 FL (ref 11.5–15)
PHENCYCLIDINE SCREEN URINE: NOT DETECTED
PLATELET # BLD: 387 E9/L (ref 130–450)
PMV BLD AUTO: 9.9 FL (ref 7–12)
PROTHROMBIN TIME: 10.9 SEC (ref 9.3–12.4)
RBC # BLD: 3.83 E12/L (ref 3.5–5.5)
WBC # BLD: 8.4 E9/L (ref 4.5–11.5)

## 2020-12-09 PROCEDURE — 10907ZC DRAINAGE OF AMNIOTIC FLUID, THERAPEUTIC FROM PRODUCTS OF CONCEPTION, VIA NATURAL OR ARTIFICIAL OPENING: ICD-10-PCS | Performed by: OBSTETRICS & GYNECOLOGY

## 2020-12-09 PROCEDURE — 2500000003 HC RX 250 WO HCPCS: Performed by: ANESTHESIOLOGY

## 2020-12-09 PROCEDURE — 36415 COLL VENOUS BLD VENIPUNCTURE: CPT

## 2020-12-09 PROCEDURE — 3700000025 EPIDURAL BLOCK: Performed by: ANESTHESIOLOGY

## 2020-12-09 PROCEDURE — 86900 BLOOD TYPING SEROLOGIC ABO: CPT

## 2020-12-09 PROCEDURE — 85730 THROMBOPLASTIN TIME PARTIAL: CPT

## 2020-12-09 PROCEDURE — 85027 COMPLETE CBC AUTOMATED: CPT

## 2020-12-09 PROCEDURE — 1220000000 HC SEMI PRIVATE OB R&B

## 2020-12-09 PROCEDURE — 6370000000 HC RX 637 (ALT 250 FOR IP): Performed by: OBSTETRICS & GYNECOLOGY

## 2020-12-09 PROCEDURE — 2500000003 HC RX 250 WO HCPCS

## 2020-12-09 PROCEDURE — 51701 INSERT BLADDER CATHETER: CPT

## 2020-12-09 PROCEDURE — 6360000002 HC RX W HCPCS: Performed by: OBSTETRICS & GYNECOLOGY

## 2020-12-09 PROCEDURE — 0UQMXZZ REPAIR VULVA, EXTERNAL APPROACH: ICD-10-PCS | Performed by: OBSTETRICS & GYNECOLOGY

## 2020-12-09 PROCEDURE — 86901 BLOOD TYPING SEROLOGIC RH(D): CPT

## 2020-12-09 PROCEDURE — 2580000003 HC RX 258: Performed by: OBSTETRICS & GYNECOLOGY

## 2020-12-09 PROCEDURE — 85014 HEMATOCRIT: CPT

## 2020-12-09 PROCEDURE — 80307 DRUG TEST PRSMV CHEM ANLYZR: CPT

## 2020-12-09 PROCEDURE — 85610 PROTHROMBIN TIME: CPT

## 2020-12-09 PROCEDURE — 3E033VJ INTRODUCTION OF OTHER HORMONE INTO PERIPHERAL VEIN, PERCUTANEOUS APPROACH: ICD-10-PCS | Performed by: OBSTETRICS & GYNECOLOGY

## 2020-12-09 PROCEDURE — 86850 RBC ANTIBODY SCREEN: CPT

## 2020-12-09 PROCEDURE — 7200000001 HC VAGINAL DELIVERY

## 2020-12-09 PROCEDURE — 85018 HEMOGLOBIN: CPT

## 2020-12-09 RX ORDER — METHYLERGONOVINE MALEATE 0.2 MG/ML
200 INJECTION INTRAVENOUS PRN
Status: DISCONTINUED | OUTPATIENT
Start: 2020-12-09 | End: 2020-12-12 | Stop reason: HOSPADM

## 2020-12-09 RX ORDER — ACETAMINOPHEN 650 MG
TABLET, EXTENDED RELEASE ORAL
Status: COMPLETED
Start: 2020-12-09 | End: 2020-12-09

## 2020-12-09 RX ORDER — ACETAMINOPHEN 650 MG
TABLET, EXTENDED RELEASE ORAL
Status: DISCONTINUED
Start: 2020-12-09 | End: 2020-12-09 | Stop reason: WASHOUT

## 2020-12-09 RX ORDER — ONDANSETRON 2 MG/ML
4 INJECTION INTRAMUSCULAR; INTRAVENOUS EVERY 6 HOURS PRN
Status: DISCONTINUED | OUTPATIENT
Start: 2020-12-09 | End: 2020-12-09

## 2020-12-09 RX ORDER — FERROUS SULFATE 325(65) MG
325 TABLET ORAL
Status: DISCONTINUED | OUTPATIENT
Start: 2020-12-10 | End: 2020-12-11

## 2020-12-09 RX ORDER — SODIUM CHLORIDE, SODIUM LACTATE, POTASSIUM CHLORIDE, CALCIUM CHLORIDE 600; 310; 30; 20 MG/100ML; MG/100ML; MG/100ML; MG/100ML
INJECTION, SOLUTION INTRAVENOUS CONTINUOUS
Status: DISCONTINUED | OUTPATIENT
Start: 2020-12-09 | End: 2020-12-09 | Stop reason: SDUPTHER

## 2020-12-09 RX ORDER — SODIUM CHLORIDE 0.9 % (FLUSH) 0.9 %
10 SYRINGE (ML) INJECTION PRN
Status: DISCONTINUED | OUTPATIENT
Start: 2020-12-09 | End: 2020-12-12 | Stop reason: HOSPADM

## 2020-12-09 RX ORDER — BUPIVACAINE HYDROCHLORIDE 2.5 MG/ML
INJECTION, SOLUTION EPIDURAL; INFILTRATION; INTRACAUDAL PRN
Status: DISCONTINUED | OUTPATIENT
Start: 2020-12-09 | End: 2020-12-09 | Stop reason: SDUPTHER

## 2020-12-09 RX ORDER — AMMONIA INHALANTS 0.04 G/.3ML
INHALANT RESPIRATORY (INHALATION)
Status: DISPENSED
Start: 2020-12-09 | End: 2020-12-10

## 2020-12-09 RX ORDER — LIDOCAINE HYDROCHLORIDE 10 MG/ML
INJECTION, SOLUTION INFILTRATION; PERINEURAL
Status: DISCONTINUED
Start: 2020-12-09 | End: 2020-12-09

## 2020-12-09 RX ORDER — MODIFIED LANOLIN
OINTMENT (GRAM) TOPICAL PRN
Status: DISCONTINUED | OUTPATIENT
Start: 2020-12-09 | End: 2020-12-12 | Stop reason: HOSPADM

## 2020-12-09 RX ORDER — LANOLIN ALCOHOL/MO/W.PET/CERES
25 CREAM (GRAM) TOPICAL
Status: DISCONTINUED | OUTPATIENT
Start: 2020-12-10 | End: 2020-12-12 | Stop reason: HOSPADM

## 2020-12-09 RX ORDER — BUPIVACAINE HYDROCHLORIDE 2.5 MG/ML
INJECTION, SOLUTION EPIDURAL; INFILTRATION; INTRACAUDAL
Status: COMPLETED
Start: 2020-12-09 | End: 2020-12-09

## 2020-12-09 RX ORDER — PRENATAL WITH FERROUS FUM AND FOLIC ACID 3080; 920; 120; 400; 22; 1.84; 3; 20; 10; 1; 12; 200; 27; 25; 2 [IU]/1; [IU]/1; MG/1; [IU]/1; MG/1; MG/1; MG/1; MG/1; MG/1; MG/1; UG/1; MG/1; MG/1; MG/1; MG/1
1 TABLET ORAL
Status: DISCONTINUED | OUTPATIENT
Start: 2020-12-10 | End: 2020-12-12 | Stop reason: HOSPADM

## 2020-12-09 RX ORDER — ACETAMINOPHEN 325 MG/1
650 TABLET ORAL EVERY 4 HOURS PRN
Status: DISCONTINUED | OUTPATIENT
Start: 2020-12-09 | End: 2020-12-12 | Stop reason: HOSPADM

## 2020-12-09 RX ORDER — DOCUSATE SODIUM 100 MG/1
100 CAPSULE, LIQUID FILLED ORAL 2 TIMES DAILY
Status: DISCONTINUED | OUTPATIENT
Start: 2020-12-09 | End: 2020-12-12 | Stop reason: HOSPADM

## 2020-12-09 RX ORDER — SODIUM CHLORIDE 0.9 % (FLUSH) 0.9 %
10 SYRINGE (ML) INJECTION EVERY 12 HOURS SCHEDULED
Status: DISCONTINUED | OUTPATIENT
Start: 2020-12-09 | End: 2020-12-12 | Stop reason: HOSPADM

## 2020-12-09 RX ORDER — ONDANSETRON 2 MG/ML
4 INJECTION INTRAMUSCULAR; INTRAVENOUS EVERY 6 HOURS PRN
Status: DISCONTINUED | OUTPATIENT
Start: 2020-12-09 | End: 2020-12-09 | Stop reason: HOSPADM

## 2020-12-09 RX ORDER — SODIUM CHLORIDE, SODIUM LACTATE, POTASSIUM CHLORIDE, CALCIUM CHLORIDE 600; 310; 30; 20 MG/100ML; MG/100ML; MG/100ML; MG/100ML
INJECTION, SOLUTION INTRAVENOUS CONTINUOUS
Status: DISCONTINUED | OUTPATIENT
Start: 2020-12-09 | End: 2020-12-12 | Stop reason: HOSPADM

## 2020-12-09 RX ORDER — LANOLIN ALCOHOL/MO/W.PET/CERES
1000 CREAM (GRAM) TOPICAL
Status: DISCONTINUED | OUTPATIENT
Start: 2020-12-10 | End: 2020-12-12 | Stop reason: HOSPADM

## 2020-12-09 RX ORDER — ASPIRIN 81 MG/1
81 TABLET, CHEWABLE ORAL DAILY
Status: DISCONTINUED | OUTPATIENT
Start: 2020-12-10 | End: 2020-12-12 | Stop reason: HOSPADM

## 2020-12-09 RX ORDER — IBUPROFEN 600 MG/1
600 TABLET ORAL EVERY 6 HOURS PRN
Status: DISCONTINUED | OUTPATIENT
Start: 2020-12-10 | End: 2020-12-12 | Stop reason: HOSPADM

## 2020-12-09 RX ORDER — NALBUPHINE HCL 10 MG/ML
5 AMPUL (ML) INJECTION EVERY 4 HOURS PRN
Status: DISCONTINUED | OUTPATIENT
Start: 2020-12-09 | End: 2020-12-09 | Stop reason: HOSPADM

## 2020-12-09 RX ORDER — NALOXONE HYDROCHLORIDE 0.4 MG/ML
0.4 INJECTION, SOLUTION INTRAMUSCULAR; INTRAVENOUS; SUBCUTANEOUS PRN
Status: DISCONTINUED | OUTPATIENT
Start: 2020-12-09 | End: 2020-12-09 | Stop reason: HOSPADM

## 2020-12-09 RX ORDER — LIDOCAINE HYDROCHLORIDE 10 MG/ML
INJECTION, SOLUTION INFILTRATION; PERINEURAL
Status: DISCONTINUED
Start: 2020-12-09 | End: 2020-12-09 | Stop reason: WASHOUT

## 2020-12-09 RX ORDER — FOLIC ACID 1 MG/1
2 TABLET ORAL
Status: DISCONTINUED | OUTPATIENT
Start: 2020-12-10 | End: 2020-12-12 | Stop reason: HOSPADM

## 2020-12-09 RX ADMIN — Medication: at 18:33

## 2020-12-09 RX ADMIN — SODIUM CHLORIDE, POTASSIUM CHLORIDE, SODIUM LACTATE AND CALCIUM CHLORIDE 999 ML/HR: 600; 310; 30; 20 INJECTION, SOLUTION INTRAVENOUS at 13:24

## 2020-12-09 RX ADMIN — SODIUM CHLORIDE, POTASSIUM CHLORIDE, SODIUM LACTATE AND CALCIUM CHLORIDE 999 ML/HR: 600; 310; 30; 20 INJECTION, SOLUTION INTRAVENOUS at 16:18

## 2020-12-09 RX ADMIN — BENZOCAINE AND LEVOMENTHOL: 200; 5 SPRAY TOPICAL at 23:47

## 2020-12-09 RX ADMIN — Medication: at 23:47

## 2020-12-09 RX ADMIN — Medication 1 MILLI-UNITS/MIN: at 09:07

## 2020-12-09 RX ADMIN — BUPIVACAINE HYDROCHLORIDE 10 ML: 2.5 INJECTION, SOLUTION EPIDURAL; INFILTRATION; INTRACAUDAL; PERINEURAL at 16:25

## 2020-12-09 RX ADMIN — Medication: at 18:29

## 2020-12-09 RX ADMIN — SODIUM CHLORIDE, POTASSIUM CHLORIDE, SODIUM LACTATE AND CALCIUM CHLORIDE: 600; 310; 30; 20 INJECTION, SOLUTION INTRAVENOUS at 09:07

## 2020-12-09 RX ADMIN — Medication 15 ML/HR: at 13:54

## 2020-12-09 RX ADMIN — Medication 166.7 ML: at 18:48

## 2020-12-09 RX ADMIN — DOCUSATE SODIUM 100 MG: 100 CAPSULE ORAL at 23:47

## 2020-12-09 NOTE — H&P
Department of Obstetrics and Gynecology  Labor and Delivery  History & Physical    Patient:  Heather Canseco Date:  2020  8:12 AM  Medical Record Number:  [id number]    CHIEF COMPLAINT: High risk multigravid at 44 weeks 1 day with thrombophilia for Pitocin labor induction with a Sotelo score = 8. PROBLEM LIST:     Patient Active Problem List   Diagnosis    Nausea and vomiting during pregnancy prior to 22 weeks gestation    39 1/7 weeks gestation of pregnancy    High risk multigravida, third trimester    Thrombophilia affecting pregnancy in third trimester, antepartum (on 81 mg ASA, FA/B6/B12 added lovenox)    MTHFR mutation ([facility] [facility] 677, 1298 hetero    Protein C deficiency affecting pregnancy ([facility]    Anticardiolipin antibody affecting pregnancy, antepartum (elevated IgM)    Maternal antithrombin III deficiency complicating pregnancy ([facility]    History of recurrent  (x3), currently pregnant in third trimester    Anemia affecting pregnancy in third trimester    Patient receiving subcutaneous low molecular weight heparin    Echogenic focus of heart of fetus affecting antepartum care of mother           HISTORY OF PRESENT ILLNESS:    The patient is a 26 y.o. [address] female [id number] at 39w1d. Patient presents with a a history of complex thrombophilia affecting pregnancy (MTHFR 103/0999 heterozygote, protein C deficiency, anticardiolipin antibodies, antithrombin 3 deficiency) -managed on 40 mg subcutaneous Lovenox daily -  for Pitocin induction of labor. Sotelo score = 8. Patient also has significant iron deficiency anemia pregnancy (36-week H&H 7.3/25.9). Patient has mild Sascha Carreon contractions off and on. She denies leaking fluid, vaginal bleeding. No symptoms of UTI or PIH. There is good fetal movement.     ESTIMATED DUE DATE: Estimated Date of Delivery: 12/15/20    PRENATAL CARE:  Complicated by: High risk pregnancy secondary to complex thrombophilia(MTHFR 677/1298 heterozygote, protein C deficiency, anticardiolipin antibodies, antithrombin 3 deficiency); moderate to severe iron deficiency anemia pregnancy; history of recurrent  x3; echogenic focus of the heart midtrimester ultrasound -resolved, cleared by MFM. No follow-up necessary; NVP prior to 22 weeks  GBS: negative    Past OB History  OB History        6    Para   2    Term   2            AB   3    Living   2       SAB   3    TAB        Ectopic        Molar        Multiple        Live Births   2                Past Medical History:        Diagnosis Date    Alpha thalassemia silent carrier     Anemia     Migraine     Thrombophilia ([facility] [address]        Past Surgical History:        Procedure Laterality Date    APPENDECTOMY      DILATION AND CURETTAGE      OTHER SURGICAL HISTORY  2016    suctuion D&C       Allergies:  Patient has no known allergies.     Social History:    Social History     Socioeconomic History    Marital status: Single     Spouse name: Not on file    Number of children: Not on file    Years of education: Not on file    Highest education level: Not on file   Occupational History    Not on file   Social Needs    Financial resource strain: Not on file    Food insecurity     Worry: Not on file     Inability: Not on file    Transportation needs     Medical: Not on file     Non-medical: Not on file   Tobacco Use    Smoking status: Never Smoker    Smokeless tobacco: Never Used   Substance and Sexual Activity    Alcohol use: Not Currently    Drug use: No    Sexual activity: Yes     Partners: Male   Lifestyle    Physical activity     Days per week: Not on file     Minutes per session: Not on file    Stress: Not on file   Relationships    Social connections     Talks on phone: Not on file     Gets together: Not on file     Attends Jewish service: Not on file     Active member of club or organization: Not on file     Attends meetings of clubs or organizations: Not on file Relationship status: Not on file    Intimate partner violence     Fear of current or ex partner: Not on file     Emotionally abused: Not on file     Physically abused: Not on file     Forced sexual activity: Not on file   Other Topics Concern    Not on file   Social History Narrative    Not on file       Family History:   No family history on file. Medications Prior to Admission:  Medications Prior to Admission: ferrous sulfate (IRON 325) 325 (65 Fe) MG tablet, Take 1 tablet by mouth 3 times daily (with meals)  enoxaparin (LOVENOX) 40 MG/0.4ML injection, Inject 0.4 mLs into the skin daily  doxyLAMINE succinate (GNP SLEEP AID) 25 MG tablet, Take 1 tablet by mouth nightly (Patient not taking: Reported on 10/9/2020)  aspirin (ASPIRIN CHILDRENS) 81 MG chewable tablet, Take 1 tablet by mouth daily  folic acid (FOLVITE) 1 MG tablet, Take 2 tablets by mouth daily  pyridoxine (B-6) 25 MG tablet, Take 1 tablet by mouth daily (Patient not taking: Reported on 10/9/2020)  Cyanocobalamin (B-12) 1000 MCG TABS, Take 1 tablet by mouth daily  Prenatal Vit-Fe Fumarate-FA (PRENATAL VITAMINS) 28-0.8 MG TABS, Take 1 tablet by mouth daily    REVIEW OF SYSTEMS:  CONSTITUTIONAL:  negative  RESPIRATORY:  negative  CARDIOVASCULAR:  negative  GASTROINTESTINAL:  negative  ALLERGIC/IMMUNOLOGIC:  negative  NEUROLOGICAL:  negative  BEHAVIOR/PSYCH:  negative    PHYSICAL EXAM:  Vitals:    12/09/20 0828   BP: 118/75   Pulse: 93   Resp: 16   Temp: 98.3 °F (36.8 °C)   TempSrc: Oral   Weight: 156 lb (70.8 kg)   Height: 5' 2\" (1.575 m)     General appearance:  awake, alert, cooperative, no apparent distress, and appears stated age  Neurologic:  Awake, alert, oriented to name, place and time.   Skin: warm, dry, normal color, no rashes  Head:  NC,AT,NT  Eyes:  Sclerae white, pupils equal and reactive, red reflex normal bilaterally  Ears:  Well-positioned, well-formed pinnae; Hearing: intact  Nose:  Clear, normal mucosa  Throat:  Lips, tongue and mucosa are pink, moist and intact; no exudate  Neck:  Supple, symmetrical  Heart:  Regular rate & rhythm, S1 S2, no murmurs, rubs, or gallops  Lungs:  No increased work of breathing, good air exchange, CTA b/l. Abdomen:  Soft, non tender, gravid, consistent with her gestational age, EFW by Venkatesh's sandyouever was 7#  Extremities: No clubbing, cyanosis, cords; No calf tenderness; Edema: no  Pulses:  Strong equal distal pulses, brisk capillary refill  Fetal heart rate:  Reassuring. Pelvis:  Adequate pelvis  Cervix: 2 cm / 70% / soft / -1 / mid, Sotelo Score: 8  Contraction frequency: Present, irregular and mild. Membranes:  Intact    Labs:  Recent Results (from the past 72 hour(s))   US OB FOLLOW UP TRANSABDOMINAL APPROACH    Collection Time: 20 12:00 AM   Result Value Ref Range    Biparietal Diameter      Abdominal Circumference      Femoral Diameter      Head Circumference      HC/AC      Estimated Fetal Weight         ASSESSMENT:  26 y.o. AA female at 39w1d [id number]  High risk multigravid with complex thrombophilia (MTHFR 677/1298 heterozygote, protein C deficiency, anticardiolipin antibodies, antithrombin 3 deficiency) on 40 mg subcu Lovenox daily; spontaneous recurrent  x3; iron deficiency anemia pregnancy;   Patient Active Problem List   Diagnosis    Nausea and vomiting during pregnancy prior to 22 weeks gestation    39 1/7 weeks gestation of pregnancy    High risk multigravida, third trimester    Thrombophilia affecting pregnancy in third trimester, antepartum (on 81 mg ASA, FA/B6/B12 added lovenox)    MTHFR mutation ([address] 677, 1298 hetero    Protein C deficiency affecting pregnancy ([address]    Anticardiolipin antibody affecting pregnancy, antepartum (elevated IgM)    Maternal antithrombin III deficiency complicating pregnancy ([address]    History of recurrent  (x3), currently pregnant in third trimester    Anemia affecting pregnancy in third trimester    Patient receiving subcutaneous low molecular weight heparin    Echogenic focus of heart of fetus affecting antepartum care of mother     Fetus: Reassuring  GBS: negative    PLAN:  Orders Placed This Encounter   Procedures    APTT    Protime-INR    CBC    Urine Drug Screen    Diet NPO Effective Now    Patient may have epidural    Vital signs per unit routine    Continuous external fetal heart rate monitoring    External uterine contraction monitoring    Notify physician for abnormal labs, nonreassuring fetal status, nonprogressive labor, impending delivery    Up with assistance    I/O per unit routine    Full Code    Nonrebreather mask oxygen    TYPE AND SCREEN    PATIENT STATUS (DIRECT) Inpatient     Current Facility-Administered Medications   Medication Dose Route Frequency Provider Last Rate Last Dose    oxytocin (PITOCIN) 30 units in 500 mL infusion  1 cam-units/min Intravenous Continuous Aminata De Oliveira MD        lactated ringers infusion   Intravenous Continuous Aminata De Oliveira MD        ondansetron [facility]) injection 4 mg  4 mg Intravenous Q6H PRN Aminata De Oliveira MD       The risks and benefits of labor induction with Pitocin were again reviewed with the patient. Questions were answered to her satisfaction. She is aware of the risks of labor induction including  section and its associated risks. Informed consent was obtained to proceed as planned. Aminata De Oliveira M.D.  FACOG  2020 8:30 AM

## 2020-12-09 NOTE — ANESTHESIA PROCEDURE NOTES
Epidural Block    Patient location during procedure: OB  Reason for block: labor epidural  Staffing  Anesthesiologist: Elsy Mosquera MD  Resident/CRNA: CLEMENTE Leavitt - CRNA  Performed: resident/CRNA and anesthesiologist   Preanesthetic Checklist  Completed: patient identified, site marked, surgical consent, pre-op evaluation, timeout performed, IV checked, risks and benefits discussed, monitors and equipment checked, anesthesia consent given, oxygen available and patient being monitored  Epidural  Patient position: sitting  Prep: ChloraPrep  Patient monitoring: cardiac monitor, continuous pulse ox and frequent blood pressure checks  Approach: midline  Location: lumbar (1-5)  Injection technique: ELVIN saline  Provider prep: mask and sterile gloves  Needle  Needle type: Tuohy   Needle gauge: 18 G  Needle length: 3.5 in  Needle insertion depth: 6 cm  Catheter type: end hole  Catheter at skin depth: 11 cm  Test dose: negative  Assessment  Hemodynamics: stable  Attempts: 2

## 2020-12-09 NOTE — ANESTHESIA PRE PROCEDURE
Department of Anesthesiology  Preprocedure Note       Name:  Christo Ramos   Age:  26 y.o.  :  1990                                          MRN:  [id number]         Date:  2020      Surgeon: * No surgeons listed *    Procedure: * No procedures listed *    Medications prior to admission:   Prior to Admission medications    Medication Sig Start Date End Date Taking?  Authorizing Provider   ferrous sulfate (IRON 325) 325 (65 Fe) MG tablet Take 1 tablet by mouth 3 times daily (with meals) 10/12/20  Yes CLEMENTE Chowdhury CNM   aspirin (ASPIRIN CHILDRENS) 81 MG chewable tablet Take 1 tablet by mouth daily 20  Yes CLEMENTE Chowdhury - JIM   folic acid (FOLVITE) 1 MG tablet Take 2 tablets by mouth daily 20  Yes CLEMENTE Chowdhury CNM   Cyanocobalamin (B-12) 1000 MCG TABS Take 1 tablet by mouth daily 20  Yes CLEMENTE Chowdhury CNM   Prenatal Vit-Fe Fumarate-FA (PRENATAL VITAMINS) 28-0.8 MG TABS Take 1 tablet by mouth daily 19  Yes CLEMENTE Chowdhury - JIM   enoxaparin (LOVENOX) 40 MG/0.4ML injection Inject 0.4 mLs into the skin daily 20   CLEMENTE Garrido CNP   doxyLAMINE succinate (GNP SLEEP AID) 25 MG tablet Take 1 tablet by mouth nightly  Patient not taking: Reported on 10/9/2020 5/13/20   CLEMENTE Chowdhury CNM   pyridoxine (B-6) 25 MG tablet Take 1 tablet by mouth daily  Patient not taking: Reported on 10/9/2020 1/8/20   CLEMENTE Chowdhury CNM       Current medications:    Current Facility-Administered Medications   Medication Dose Route Frequency Provider Last Rate Last Dose    oxytocin (PITOCIN) 30 units in 500 mL infusion  1 cam-units/min Intravenous Continuous Joanna Castano MD 6 mL/hr at 20 1126 6 cam-units/min at 20 1126    lactated ringers infusion   Intravenous Continuous Joanna Castano  mL/hr at 20 0907      ondansetron (ZOFRAN) injection 4 mg 4 mg Intravenous Q6H PRN Chika Judge MD        oxytocin (PITOCIN) 10 unit bolus from the bag  10 Units Intravenous PRN Chika Judge MD        And    oxytocin (PITOCIN) 30 units in 500 mL infusion  87.3 cam-units/min Intravenous PRN Chika Judge MD        povidone-iodine (BETADINE) 10 % external solution             lidocaine 1 % injection                Allergies:  No Known Allergies    Problem List:    Patient Active Problem List   Diagnosis Code    Nausea and vomiting during pregnancy prior to 22 weeks gestation O21.9    39 1/7 weeks gestation of pregnancy Z3A.39    High risk multigravida, third trimester O09.43    Thrombophilia affecting pregnancy in third trimester, antepartum (on 81 mg ASA, FA/B6/B12 added lovenox) O99.113, D68.59    MTHFR mutation ([facility] [address] 677, 1298 hetero E72.12    Protein C deficiency affecting pregnancy ([facility] O99.119, D68.59    Anticardiolipin antibody affecting pregnancy, antepartum (elevated IgM) O99.891, R76.0    Maternal antithrombin III deficiency complicating pregnancy ([facility] [address] O99.119, D68.59    History of recurrent  (x3), currently pregnant in third trimester O26.23    Anemia affecting pregnancy in third trimester O99.013    Patient receiving subcutaneous low molecular weight heparin Z79.01    Echogenic focus of heart of fetus affecting antepartum care of mother O35. 8XX0       Past Medical History:        Diagnosis Date    Alpha thalassemia silent carrier     Anemia     Migraine     Thrombophilia ([facility] [address]        Past Surgical History:        Procedure Laterality Date    APPENDECTOMY      DILATION AND CURETTAGE      OTHER SURGICAL HISTORY  2016    genevieve D&C       Social History:    Social History     Tobacco Use    Smoking status: Never Smoker    Smokeless tobacco: Never Used   Substance Use Topics    Alcohol use: Not Currently                                Counseling given: Not Answered      Vital Signs (Current): Vitals:    12/09/20 0828 12/09/20 1054 12/09/20 1125 12/09/20 1155   BP: 118/75 127/82 122/83 130/86   Pulse: 93 66 65 75   Resp: 16 16     Temp: 36.8 °C (98.3 °F) 36.7 °C (98 °F)     TempSrc: Oral Oral     Weight: 156 lb (70.8 kg)      Height: 5' 2\" (1.575 m)                                                 BP Readings from Last 3 Encounters:   12/09/20 130/86   12/07/20 122/78   11/25/20 120/76       NPO Status: Time of last liquid consumption: 0600                        Time of last solid consumption: 0600                        Date of last liquid consumption: 12/09/20                        Date of last solid food consumption: 12/09/20    BMI:   Wt Readings from Last 3 Encounters:   12/09/20 156 lb (70.8 kg)   12/07/20 157 lb 6.4 oz (71.4 kg)   11/25/20 159 lb 12.8 oz (72.5 kg)     Body mass index is 28.53 kg/m². CBC:   Lab Results   Component Value Date    WBC 8.4 12/09/2020    RBC 3.83 12/09/2020    HGB 7.8 12/09/2020    HCT 26.7 12/09/2020    MCV 69.7 12/09/2020    RDW 21.2 12/09/2020     12/09/2020       CMP:   Lab Results   Component Value Date     01/14/2019    K 3.0 01/14/2019     01/14/2019    CO2 24 01/14/2019    BUN 8 01/14/2019    CREATININE 0.5 01/14/2019    GFRAA >60 01/14/2019    LABGLOM >60 01/14/2019    GLUCOSE 109 01/14/2019    PROT 6.9 01/08/2019    CALCIUM 8.9 01/14/2019    BILITOT 0.2 01/08/2019    ALKPHOS 64 01/08/2019    AST 17 01/08/2019    ALT 8 01/08/2019       POC Tests: No results for input(s): POCGLU, POCNA, POCK, POCCL, POCBUN, POCHEMO, POCHCT in the last 72 hours.     Coags:   Lab Results   Component Value Date    PROTIME 10.9 12/09/2020    INR 0.9 12/09/2020    APTT 31.3 12/09/2020       HCG (If Applicable):   Lab Results   Component Value Date    PREGTESTUR positive 12/08/2018        ABGs: No results found for: PHART, PO2ART, NBX3KBV, IQL4KOT, BEART, C1LOPZEP     Type & Screen (If Applicable):  No results found for: LABABO, LABRH    Drug/Infectious Status

## 2020-12-10 PROBLEM — D62 ANEMIA ASSOCIATED WITH ACUTE BLOOD LOSS: Status: ACTIVE | Noted: 2020-12-10

## 2020-12-10 LAB
HCT VFR BLD CALC: 20.9 % (ref 34–48)
HEMOGLOBIN: 5.8 G/DL (ref 11.5–15.5)
MCH RBC QN AUTO: 19.9 PG (ref 26–35)
MCHC RBC AUTO-ENTMCNC: 27.8 % (ref 32–34.5)
MCV RBC AUTO: 71.8 FL (ref 80–99.9)
PDW BLD-RTO: 20.9 FL (ref 11.5–15)
PLATELET # BLD: 279 E9/L (ref 130–450)
PMV BLD AUTO: 9.9 FL (ref 7–12)
RBC # BLD: 2.91 E12/L (ref 3.5–5.5)
WBC # BLD: 16.7 E9/L (ref 4.5–11.5)

## 2020-12-10 PROCEDURE — 85027 COMPLETE CBC AUTOMATED: CPT

## 2020-12-10 PROCEDURE — 90715 TDAP VACCINE 7 YRS/> IM: CPT | Performed by: OBSTETRICS & GYNECOLOGY

## 2020-12-10 PROCEDURE — 6370000000 HC RX 637 (ALT 250 FOR IP): Performed by: OBSTETRICS & GYNECOLOGY

## 2020-12-10 PROCEDURE — 36415 COLL VENOUS BLD VENIPUNCTURE: CPT

## 2020-12-10 PROCEDURE — 1220000000 HC SEMI PRIVATE OB R&B

## 2020-12-10 PROCEDURE — 6360000002 HC RX W HCPCS: Performed by: NURSE PRACTITIONER

## 2020-12-10 PROCEDURE — 2580000003 HC RX 258: Performed by: OBSTETRICS & GYNECOLOGY

## 2020-12-10 PROCEDURE — 2580000003 HC RX 258: Performed by: NURSE PRACTITIONER

## 2020-12-10 PROCEDURE — 6360000002 HC RX W HCPCS: Performed by: OBSTETRICS & GYNECOLOGY

## 2020-12-10 RX ORDER — MODIFIED LANOLIN
1 OINTMENT (GRAM) TOPICAL PRN
COMMUNITY
Start: 2020-12-10

## 2020-12-10 RX ORDER — IBUPROFEN 600 MG/1
600 TABLET ORAL EVERY 6 HOURS PRN
Qty: 30 TABLET | Refills: 0 | Status: SHIPPED | OUTPATIENT
Start: 2020-12-10

## 2020-12-10 RX ORDER — PSEUDOEPHEDRINE HCL 30 MG
100 TABLET ORAL 2 TIMES DAILY PRN
COMMUNITY
Start: 2020-12-10

## 2020-12-10 RX ADMIN — ASPIRIN 81 MG CHEWABLE TABLET 81 MG: 81 TABLET CHEWABLE at 09:35

## 2020-12-10 RX ADMIN — FOLIC ACID 2 MG: 1 TABLET ORAL at 09:35

## 2020-12-10 RX ADMIN — FERROUS SULFATE TAB 325 MG (65 MG ELEMENTAL FE) 325 MG: 325 (65 FE) TAB at 12:30

## 2020-12-10 RX ADMIN — DOCUSATE SODIUM 100 MG: 100 CAPSULE ORAL at 09:35

## 2020-12-10 RX ADMIN — SODIUM CHLORIDE, POTASSIUM CHLORIDE, SODIUM LACTATE AND CALCIUM CHLORIDE: 600; 310; 30; 20 INJECTION, SOLUTION INTRAVENOUS at 00:54

## 2020-12-10 RX ADMIN — ENOXAPARIN SODIUM 40 MG: 40 INJECTION SUBCUTANEOUS at 09:35

## 2020-12-10 RX ADMIN — SODIUM CHLORIDE 100 MG: 9 INJECTION, SOLUTION INTRAVENOUS at 15:36

## 2020-12-10 RX ADMIN — DOCUSATE SODIUM 100 MG: 100 CAPSULE ORAL at 22:22

## 2020-12-10 RX ADMIN — TETANUS TOXOID, REDUCED DIPHTHERIA TOXOID AND ACELLULAR PERTUSSIS VACCINE, ADSORBED 0.5 ML: 5; 2.5; 8; 8; 2.5 SUSPENSION INTRAMUSCULAR at 22:23

## 2020-12-10 RX ADMIN — FERROUS SULFATE TAB 325 MG (65 MG ELEMENTAL FE) 325 MG: 325 (65 FE) TAB at 18:04

## 2020-12-10 RX ADMIN — SODIUM CHLORIDE, PRESERVATIVE FREE 10 ML: 5 INJECTION INTRAVENOUS at 09:36

## 2020-12-10 RX ADMIN — CYANOCOBALAMIN TAB 1000 MCG 1000 MCG: 1000 TAB at 09:58

## 2020-12-10 RX ADMIN — SODIUM CHLORIDE, PRESERVATIVE FREE 10 ML: 5 INJECTION INTRAVENOUS at 18:04

## 2020-12-10 RX ADMIN — FERROUS SULFATE TAB 325 MG (65 MG ELEMENTAL FE) 325 MG: 325 (65 FE) TAB at 08:50

## 2020-12-10 RX ADMIN — SODIUM CHLORIDE 25 MG: 9 INJECTION, SOLUTION INTRAVENOUS at 14:17

## 2020-12-10 RX ADMIN — METFORMIN HYDROCHLORIDE 1 TABLET: 500 TABLET, EXTENDED RELEASE ORAL at 09:35

## 2020-12-10 RX ADMIN — ACETAMINOPHEN 650 MG: 325 TABLET ORAL at 19:01

## 2020-12-10 ASSESSMENT — PAIN DESCRIPTION - ONSET: ONSET: GRADUAL

## 2020-12-10 ASSESSMENT — PAIN DESCRIPTION - LOCATION: LOCATION: HEAD

## 2020-12-10 ASSESSMENT — PAIN DESCRIPTION - PROGRESSION: CLINICAL_PROGRESSION: GRADUALLY WORSENING

## 2020-12-10 ASSESSMENT — PAIN SCALES - GENERAL
PAINLEVEL_OUTOF10: 5
PAINLEVEL_OUTOF10: 0

## 2020-12-10 ASSESSMENT — PAIN - FUNCTIONAL ASSESSMENT: PAIN_FUNCTIONAL_ASSESSMENT: ACTIVITIES ARE NOT PREVENTED

## 2020-12-10 ASSESSMENT — PAIN DESCRIPTION - PAIN TYPE: TYPE: ACUTE PAIN

## 2020-12-10 ASSESSMENT — PAIN DESCRIPTION - ORIENTATION: ORIENTATION: ANTERIOR

## 2020-12-10 ASSESSMENT — PAIN DESCRIPTION - FREQUENCY: FREQUENCY: INTERMITTENT

## 2020-12-10 ASSESSMENT — PAIN DESCRIPTION - DESCRIPTORS: DESCRIPTORS: ACHING

## 2020-12-10 NOTE — PROGRESS NOTES
Patient up to void using steady, unable to void at this time. Pain minimal, bleeding small. No clots noted. Gown changed, pad and underwear applied. Will transfer to mom/baby per order.

## 2020-12-10 NOTE — PROGRESS NOTES
- 450 E9/L    MPV 9.9 7.0 - 12.0 fL   TYPE AND SCREEN    Collection Time: 20  8:49 AM   Result Value Ref Range    ABO/Rh A POS     Antibody Screen NEG    Urine Drug Screen    Collection Time: 20  8:49 AM   Result Value Ref Range    Amphetamine Screen, Urine NOT DETECTED Negative <1000 ng/mL    Barbiturate Screen, Ur NOT DETECTED Negative < 200 ng/mL    Benzodiazepine Screen, Urine NOT DETECTED Negative < 200 ng/mL    Cannabinoid Scrn, Ur NOT DETECTED Negative < 50ng/mL    Cocaine Metabolite Screen, Urine NOT DETECTED Negative < 300 ng/mL    Opiate Scrn, Ur NOT DETECTED Negative < 300ng/mL    PCP Screen, Urine NOT DETECTED Negative < 25 ng/mL    Methadone Screen, Urine NOT DETECTED Negative <300 ng/mL    Oxycodone Urine NOT DETECTED Negative <100 ng/mL    FENTANYL SCREEN, URINE NOT DETECTED Negative <1 ng/mL    Drug Screen Comment: see below    HEMOGLOBIN AND HEMATOCRIT, BLOOD    Collection Time: 20  7:19 PM   Result Value Ref Range    Hemoglobin 7.3 (L) 11.5 - 15.5 g/dL    Hematocrit 25.9 (L) 34.0 - 48.0 %   CBC    Collection Time: 12/10/20  3:53 AM   Result Value Ref Range    WBC 16.7 (H) 4.5 - 11.5 E9/L    RBC 2.91 (L) 3.50 - 5.50 E12/L    Hemoglobin 5.8 (L) 11.5 - 15.5 g/dL    Hematocrit 20.9 (L) 34.0 - 48.0 %    MCV 71.8 (L) 80.0 - 99.9 fL    MCH 19.9 (L) 26.0 - 35.0 pg    MCHC 27.8 (L) 32.0 - 34.5 %    RDW 20.9 (H) 11.5 - 15.0 fL    Platelets 080 556 - 712 E9/L    MPV 9.9 7.0 - 12.0 fL       A: 26 y.o. female [id number] at 39w1d weeks  PPD #1 S/P ; Episiotomy was not needed due to a spontaneous left labial laceration  Acute blood loss anemia superimposed on chronic iron deficiency anemia -H&H 5.8/20.9 decreased from 7.8 to 26.7 on admission -patient asymptomatic and hemodynamically stable - hopes to avoid a blood transfusion.   Patient Active Problem List   Diagnosis    Nausea and vomiting during pregnancy prior to 22 weeks gestation    39 1/7 weeks gestation of pregnancy    High risk multigravida, third trimester    Thrombophilia affecting pregnancy in third trimester, antepartum (on 81 mg ASA, FA/B6/B12 added lovenox)    MTHFR mutation ([facility] 677, 1298 hetero    Protein C deficiency affecting pregnancy ([address]    Anticardiolipin antibody affecting pregnancy, antepartum (elevated IgM)    Maternal antithrombin III deficiency complicating pregnancy ([address]    History of recurrent  (x3), currently pregnant in third trimester    Anemia affecting pregnancy in third trimester    Patient receiving subcutaneous low molecular weight heparin    Echogenic focus of heart of fetus affecting antepartum care of mother     (normal spontaneous vaginal delivery)    Term birth of  female    Obstetric left labial laceration, delivered, current hospitalization    Anemia associated with acute blood loss       P: Routine PP care. Resume PNV and Iron as ordered. Consult Heme/Onc - Dr Jacklyn Choudhury - patient is established - for acute blood loss anemia superimposed on anemia of chronic iron deficiency in an asymptomatic patient. Resume Lovenox 40 sq daily, 81mg ASA, B6/B12/FA for complex thrombophilia x 8 weeks.   Advance diet and activity as tolerated - use caution with activity (position changes, avoid straining with BM and hot showers etc)  Patient to monitor for s/s of severe anemia - HA/CP/SOB/light headed-dizziness etc  Repeat H&H in AM.  Home tomorrow     MD Melita Madrigal  12/10/2020 11:06 AM

## 2020-12-10 NOTE — PROGRESS NOTES
Assumed care of patient. Patient resting in bed, bonding well with baby. FOB at bedside. Fundus firm, bleeding small. Dr. Beti Tamayo updated on bleeding, VS, H/H results. Orders for repeat CBC in the morning.

## 2020-12-10 NOTE — ANESTHESIA POSTPROCEDURE EVALUATION
Department of Anesthesiology  Postprocedure Note    Patient: Davonte Ayon  MRN: [id number]  YOB: 1990  Date of evaluation: 12/10/2020  Time:  1:45 PM     Procedure Summary     Date:  12/09/20 Room / Location:      Anesthesia Start:  1320 Anesthesia Stop:  Jem Sanders    Procedure:  Labor Analgesia Diagnosis:      Scheduled Providers:   Responsible Provider:  Coco Darby MD    Anesthesia Type:  general, spinal, epidural ASA Status:  3          Anesthesia Type: general, spinal, epidural    Iam Phase I:      Iam Phase II:      Last vitals: Reviewed and per EMR flowsheets.        Anesthesia Post Evaluation    Patient location during evaluation: bedside  Patient participation: complete - patient participated  Level of consciousness: awake and alert  Airway patency: patent  Nausea & Vomiting: no nausea and no vomiting  Complications: no  Cardiovascular status: hemodynamically stable  Respiratory status: spontaneous ventilation and room air  Hydration status: stable

## 2020-12-10 NOTE — L&D DELIVERY NOTE
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, protein C deficiency, anticardiolipin antibodies, antithrombin 3 deficiency) - on Lovenox; h/o recurrent , anemia in pregancy  Patient Active Problem List   Diagnosis    Nausea and vomiting during pregnancy prior to 22 weeks gestation    39 1/7 weeks gestation of pregnancy    High risk multigravida, third trimester    Thrombophilia affecting pregnancy in third trimester, antepartum (on 81 mg ASA, FA/B6/B12 added lovenox)    MTHFR mutation ([address] 677, 1298 hetero    Protein C deficiency affecting pregnancy ([address]    Anticardiolipin antibody affecting pregnancy, antepartum (elevated IgM)    Maternal antithrombin III deficiency complicating pregnancy ([address]    History of recurrent  (x3), currently pregnant in third trimester    Anemia affecting pregnancy in third trimester    Patient receiving subcutaneous low molecular weight heparin    Echogenic focus of heart of fetus affecting antepartum care of mother     Post-delivery Diagnosis:  Living  infant Female  Patient Active Problem List   Diagnosis    Nausea and vomiting during pregnancy prior to 22 weeks gestation    44 1/7 weeks gestation of pregnancy    High risk multigravida, third trimester    Thrombophilia affecting pregnancy in third trimester, antepartum (on 81 mg ASA, FA/B6/B12 added lovenox)    MTHFR mutation ([address] 677, 1298 hetero    Protein C deficiency affecting pregnancy ([address]    Anticardiolipin antibody affecting pregnancy, antepartum (elevated IgM)    Maternal antithrombin III deficiency complicating pregnancy ([address]    History of recurrent  (x3), currently pregnant in third trimester    Anemia affecting pregnancy in third trimester    Patient receiving subcutaneous low molecular weight heparin    Echogenic focus of heart of fetus affecting antepartum care of mother     (normal spontaneous vaginal delivery)    Term birth of  female   [facility] Obstetric left labial laceration, delivered, current hospitalization     Information for the patient's :  Kiah Birch Severe [[id number]]   Normal spontaneous Vaginal Delivery -uncomplicated     Delivering Clinician: Glo Rivera Wt:   Information for the patient's :  Kiah Birch Severe [[id number]]      APGARS:     Information for the patient's :  Kiah Birch Severe [[id number]]   1 minute: 9  5 minute: 9\  Anesthesia:  epidural anesthesia    Application and Delivery:  26 y.o. AA female [id number] at 39w1d admitted with Sotelo score = 8 for a history of complex thrombophilia affecting pregnancy (MTHFR 677/1298 heterozygote, protein C deficiency, anticardiolipin antibodies, antithrombin 3 deficiency) -managed on 40 mg subcutaneous Lovenox daily - who progressed to complete and delivered Cephalic, right occiput anterior presentation via  @1841, under epidural anesthesia anesthesia,  over an intact perineum with a resulting 2.5 cm left labial laceration, without dystocia or complication, a Live Born Female infant, weighing 6# 11oz, 3020 grams, Clear fluid at delivery, bulb suctioned on perineum. A nuchal cord was not present. Spontaneous cry,  Apgar's 1 minute:  9; 5 minute:  9. The placenta was delivered with gentle traction and was noted to be intact, whole and that the umbilical cord had three vessels noted. Episiotomy was not needed due to a spontaneous left labial  laceration. Repair performed with 4 simple interrupted stitches, per routine with  Vicryl 3-0 suture. Cervix, rectum, sphincter intact. Sponge, needle, and instrument counts correct x 2.   Patient got lightheaded after delivery once she was cleaned up. She received a fluid bolus and recovered rapidly. Her vital signs remained stable. Stat H&H was 7.3/25. 9. Her admission H&H was 7.6/26.7. An H&H will be repeated in the morning.     Delivery Summary:  Mother's Information    Labor Events     labor?:  No  Rupture type:  Artificial=AROM  Fluid color:  Clear  Fluid odor:  None     Mother Delivery Information    Episiotomy:  None  Lacerations:  None  # of Repair Packets:  1  Vaginal Delivery Est. Blood Loss (mL):  250  Surgical or Additional Est. Blood Loss (mL):  0 (View Only):  Edit in Flowsheets   Combined Est. Blood Loss (mL):  250     Cross, Baby Girl Saroj Redding [[id number]]    Labor Events     labor?:  No   steroids?:  None  Cervical ripening date/time:     Antibiotics received during labor?:  No  Rupture date/time: 20 13:57:00   Rupture type:  Artificial=AROM  Fluid color:  Clear  Fluid odor:  None  Induction:  Oxytocin  Indications for induction:  Medical  Augmentation:  AROM  Indications for augmentation:  Ineffective Contraction Pattern  Labor complications:  None   Additional complications:   OB: DELIVERY - COMPLICATIONS   Anemia complicating pregnancy         Labor Event Times    Labor onset date/time: 20 1357   Dilation complete date/time:   20   Start pushin2020      Anesthesia    Method:  Epidural     Assisted Delivery Details    Forceps attempted?:  No  Vacuum extractor attempted?:  No     Shoulder Dystocia    Shoulder dystocia present?:  No      Presentation    Presentation:  Vertex  Position:  Right  _:  Occiput  _:  Anterior      Information    Head delivery date/time:  2020 18:41:00   Changing the 's delivery date/time could affect patient care.:     Delivery date/time:   20   Delivery type:  Vaginal, Spontaneous     Delivery Providers    Delivering clinician:  Keyur Allen MD   Provider Role    Ivania Corrales Registered Nurse to maternity     Blood Type and Rh: A POS    Rubella Immunity Status:   Immune           Infant Feeding:    breast    Attending Attestation: I performed the procedure.     Dick Black MD, [address] [place] [address]  12/9/2020 7:27 PM

## 2020-12-10 NOTE — LACTATION NOTE
First child to breastfeed. Assisted with feeding- shield used for latch. Encouraged to offer breast first before supplementing to help establish her milk supply. Has EBP at home. Lactation contact numbers & [employer] shelly given.

## 2020-12-10 NOTE — FLOWSHEET NOTE
Pt able to bear weight on left leg,m assisted to bathroom x 2 nurses, voided. After voiding pt stated she did not feel well, became lightheaded. Pt did not pass out,ammonia inhaler used. Irina care quickly completed and pt taken back to bed with stedy. Vital signs as charted. Fundus firm at umbilicus. Cold cloth to forehead. Pt resting, drinking juice. Pt felt better after back to bed. Pt to call for assist to bathroom with next void. IV, Lactated ringers infusing.

## 2020-12-10 NOTE — DISCHARGE INSTR - ACTIVITY
After Your Delivery Discharge Instructions    What to do after you leave the hospital:    Recommended diet: regular diet  Recommended activity: No driving for 1 weeks, no sex or tampon use for 6 weeks. No douching. No heavy lifting (greater than baby and carrier) for 6 weeks. Initially, avoid frequent ambulation with stairs - start slowly then increase as tolerated. You may return to work in 6 weeks. Showers are fine - avoid bath tubs, hot tubs, swimming. Follow-up in 6 weeks for final postpartum visit.     Call the Physician with any of these signs and symptoms:    Warning signs regarding stitches:  · \"Popping\" of stitches  · Foul smelling discharge or pus  · More redness or streaks around stitches than before    Perineum / episiotomy care:  · Continue care as in the hospital (sitz baths, squirt bottle, etc.)  · No tub baths until OK'd by your Physician , showers are fine     After your delivery - signs and symptoms to watch for:  · Fever - Oral temperature greater than 100.4 degrees Fahrenheit  · Foul-smelling vaginal discharge  · Headache unrelieved by \"pain meds\"  · Difficulty urinating  · Breasts reddened, hard, hot to the touch  · Nipple discharge which is foul-smelling or contains pus  · Increased pain at the site of the laceration repair  · Difficulty breathing with or without chest pain  · New calf pain especially if only on one side  · Sudden, continuing increased vaginal bleeding (heavier than a period) with or without clots  · Unrelieved feelings of:  · Inability to cope  · Sadness  · Anxiety  · Lack of interest in baby  · Insomnia  · Crying     What to do at home:  · Resume activity gradually   · Don't lift anything heavier than baby and carrier until OK'd by your Physician   · No sex until OK'd by your Physician  · Eat regular nutritious meals  · Take pain medication as prescribed whenever you need them  · To avoid/relieve constipation take stool softeners if advised   · Increase fiber in your diet    Most importantly ENJOY your Baby!  - Dr. Manan Lilly =)))    Please call immediately with Questions and Concerns - [phone] (Office) or [phone] (Answering Service) after hours and weekends. By signing below, I understand that if any emergent problems occur, once I leave the hospital, I am to dial #[phone] or go immediately to the nearest Emergency Room. For less emergent matters,  Dr. Joshau Augustin can be reached by calling his Office or  answering service at the above numbers. I understand and acknowledge receipt of the instructions indicated above.

## 2020-12-10 NOTE — FLOWSHEET NOTE
Pt assisted to bathroom x 2 nurses. Pt voided. Instructed on art care. No lightheadedness. Pt feels better. Pt to continue to call for assist to bathroom IV infusing. Fundus firm at umbilicus.

## 2020-12-10 NOTE — PROGRESS NOTES
Subjective: The patient is awake and alert in bed, she feels good today. She denies any dizziness, SOB or headaches. Vitals signs are stable. No nausea or vomiting. Tolerating diet. S/P vaginal delivery 12/9, doing well. Objective:    /75   Pulse 76   Temp 98.6 °F (37 °C) (Oral)   Resp 16   Ht 5' 2\" (1.575 m)   Wt 156 lb (70.8 kg)   LMP 03/10/2020   SpO2 99%   Breastfeeding Unknown   BMI 28.53 kg/m²     General: NAD, pleasant, talkative   HEENT: No thrush or mucositis, EOMI, PERRLA  Heart:  RRR, no murmurs, gallops, or rubs.   Lungs:  CTA bilaterally, no wheeze, rales or rhonchi  Abd: bowel sounds present, nontender, nondistended, no masses  Extrem:  No clubbing, cyanosis, or edema  Lymphatics: No palpable adenopathy in cervical and supraclavicular regions  Skin: Intact, no petechia or purpura    CBC with Differential:    Lab Results   Component Value Date    WBC 16.7 12/10/2020    RBC 2.91 12/10/2020    HGB 5.8 12/10/2020    HCT 20.9 12/10/2020     12/10/2020    MCV 71.8 12/10/2020    MCH 19.9 12/10/2020    MCHC 27.8 12/10/2020    RDW 20.9 12/10/2020    LYMPHOPCT 18.3 01/08/2019    MONOPCT 5.3 01/08/2019    BASOPCT 0.3 01/08/2019    MONOSABS 0.38 01/08/2019    LYMPHSABS 1.31 01/08/2019    EOSABS 0.02 01/08/2019    BASOSABS 0.02 01/08/2019     CMP:    Lab Results   Component Value Date     01/14/2019    K 3.0 01/14/2019     01/14/2019    CO2 24 01/14/2019    BUN 8 01/14/2019    CREATININE 0.5 01/14/2019    GFRAA >60 01/14/2019    LABGLOM >60 01/14/2019    GLUCOSE 109 01/14/2019    PROT 6.9 01/08/2019    LABALBU 3.4 01/08/2019    CALCIUM 8.9 01/14/2019    BILITOT 0.2 01/08/2019    ALKPHOS 64 01/08/2019    AST 17 01/08/2019    ALT 8 01/08/2019         Current Facility-Administered Medications:     aspirin chewable tablet 81 mg, 81 mg, Oral, Daily, Heber Hyde MD, 81 mg at 12/10/20 0935    vitamin B-12 (CYANOCOBALAMIN) tablet 1,000 mcg, 1,000 mcg, Oral, Daily with breakfast, Faith Dobson MD, 1,000 mcg at 12/10/20 4687    enoxaparin (LOVENOX) injection 40 mg, 40 mg, Subcutaneous, Daily, Faith Dobson MD, 40 mg at 12/10/20 0935    ferrous sulfate (IRON 325) tablet 325 mg, 325 mg, Oral, TID WC, Faith Dobson MD, 325 mg at 18/29/96 7478    folic acid (FOLVITE) tablet 2 mg, 2 mg, Oral, Daily with breakfast, Faith Dobson MD, 2 mg at 12/10/20 0935    prenatal vitamin 27-1 MG tablet 1 tablet, 1 tablet, Oral, Daily with breakfast, Faith Dobson MD, 1 tablet at 12/10/20 0935    vitamin B-6 (PYRIDOXINE) tablet 25 mg, 25 mg, Oral, Daily with breakfast, Faith Dobson MD    acetaminophen (TYLENOL) tablet 650 mg, 650 mg, Oral, Q4H PRN, Faith Dobson MD    docusate sodium (COLACE) capsule 100 mg, 100 mg, Oral, BID, Faith Dobson MD, 100 mg at 12/10/20 0935    lansinoh lanolin ointment, , Topical, PRN, Faith Dobson MD    benzocaine-menthol (DERMOPLAST) 20-0.5 % spray, , Topical, PRN, Faith Dobson MD    lactated ringers infusion, , Intravenous, Continuous, Faith Dobson MD, Last Rate: 125 mL/hr at 12/10/20 0054    sodium chloride flush 0.9 % injection 10 mL, 10 mL, Intravenous, 2 times per day, Faith Dobson MD, 10 mL at 12/10/20 0936    sodium chloride flush 0.9 % injection 10 mL, 10 mL, Intravenous, PRN, Faith Dobson MD    ibuprofen (ADVIL;MOTRIN) tablet 600 mg, 600 mg, Oral, Q6H PRN, Faith Dobson MD    influenza quadrivalent split vaccine (FLUZONE;FLUARIX;FLULAVAL;AFLURIA) injection 0.5 mL, 0.5 mL, Intramuscular, Once, Faith Dobson MD    Tetanus-Diphth-Acell Pertussis (BOOSTRIX) injection 0.5 mL, 0.5 mL, Intramuscular, Prior to discharge, Faith Dobson MD    witch hazel-glycerin (TUCKS) pad, , Topical, PRN, Faith Dobson MD    methylergonovine (METHERGINE) injection 200 mcg, 200 mcg, Intramuscular, PRN, Faith Dobson MD    oxytocin (PITOCIN) 30 units in 500 mL infusion, 87.3 cam-units/min, Intravenous, PRN, Lu Andino MD, Stopped at 20 9865    oxytocin (PITOCIN) 10 unit bolus from the bag, 10 Units, Intravenous, PRN, Lu Andino MD    ammonia inhaler, , , ,     Assessment:    Active Problems:    Anemia associated with acute blood loss     (normal spontaneous vaginal delivery)    Term birth of  female    Obstetric left labial laceration, delivered, current hospitalization    Nausea and vomiting during pregnancy prior to 22 weeks gestation    44 1/7 weeks gestation of pregnancy    High risk multigravida, third trimester    Thrombophilia affecting pregnancy in third trimester, antepartum (on 81 mg ASA, FA/B6/B12 added lovenox)    MTHFR mutation ([address] 677, 1298 hetero    Protein C deficiency affecting pregnancy ([address]    Anticardiolipin antibody affecting pregnancy, antepartum (elevated IgM)    Maternal antithrombin III deficiency complicating pregnancy ([address]    History of recurrent  (x3), currently pregnant in third trimester    Anemia affecting pregnancy in third trimester    Patient receiving subcutaneous low molecular weight heparin    Echogenic focus of heart of fetus affecting antepartum care of mother  Resolved Problems:    * No resolved hospital problems. *    41-year-old pregnant female with severe iron deficiency anemia was found to have MTHFR mutation known to . She did receive one dose of IV Ferrlecit on . Labs prior to delivery were 7.8, 26.7, MCV 69.7, platelets 469. Labs today hemoglobin 5.8, hematocrit 20.9, MCV 71.8, platelets 008.        Plan:  Monitor CBC daily  We will give 3 doses of IV Ferrlecit for support    Since she is post delivery, monitor closely as her blood counts should start to recover   Discussed with   Full supportive care     Electronically signed by CLEMENTE Mccarthy NP on 12/10/2020 at 11:00 AM     Attending Addendum:      Patient seen and examined personally. All pertinent labs and imaging reviewed. Case discussed with NP. Agree with the progress note as above which has been updated to reflect my changes. She is asymoptomatic from cardiopulmonary symptoms. Proceed with iv iron infusions.   Monitor CBC w diff in AM.      Jamshid Sullivan, [address] for [address] [employer]

## 2020-12-11 LAB
HCT VFR BLD CALC: 20.9 % (ref 34–48)
HEMOGLOBIN: 5.7 G/DL (ref 11.5–15.5)

## 2020-12-11 PROCEDURE — 6370000000 HC RX 637 (ALT 250 FOR IP): Performed by: OBSTETRICS & GYNECOLOGY

## 2020-12-11 PROCEDURE — 85014 HEMATOCRIT: CPT

## 2020-12-11 PROCEDURE — 1220000000 HC SEMI PRIVATE OB R&B

## 2020-12-11 PROCEDURE — 85018 HEMOGLOBIN: CPT

## 2020-12-11 PROCEDURE — 6360000002 HC RX W HCPCS: Performed by: OBSTETRICS & GYNECOLOGY

## 2020-12-11 PROCEDURE — 2580000003 HC RX 258: Performed by: NURSE PRACTITIONER

## 2020-12-11 PROCEDURE — 6360000002 HC RX W HCPCS: Performed by: NURSE PRACTITIONER

## 2020-12-11 PROCEDURE — 2580000003 HC RX 258: Performed by: OBSTETRICS & GYNECOLOGY

## 2020-12-11 PROCEDURE — 36415 COLL VENOUS BLD VENIPUNCTURE: CPT

## 2020-12-11 RX ADMIN — FERROUS SULFATE TAB 325 MG (65 MG ELEMENTAL FE) 325 MG: 325 (65 FE) TAB at 08:31

## 2020-12-11 RX ADMIN — PYRIDOXINE HCL TAB 50 MG 25 MG: 50 TAB at 08:30

## 2020-12-11 RX ADMIN — SODIUM CHLORIDE, PRESERVATIVE FREE 10 ML: 5 INJECTION INTRAVENOUS at 11:44

## 2020-12-11 RX ADMIN — SODIUM CHLORIDE 125 MG: 900 INJECTION INTRAVENOUS at 11:44

## 2020-12-11 RX ADMIN — FOLIC ACID 2 MG: 1 TABLET ORAL at 08:32

## 2020-12-11 RX ADMIN — ASPIRIN 81 MG CHEWABLE TABLET 81 MG: 81 TABLET CHEWABLE at 08:31

## 2020-12-11 RX ADMIN — ENOXAPARIN SODIUM 40 MG: 40 INJECTION SUBCUTANEOUS at 08:29

## 2020-12-11 RX ADMIN — CYANOCOBALAMIN TAB 1000 MCG 1000 MCG: 1000 TAB at 08:31

## 2020-12-11 RX ADMIN — DOCUSATE SODIUM 100 MG: 100 CAPSULE ORAL at 20:42

## 2020-12-11 RX ADMIN — METFORMIN HYDROCHLORIDE 1 TABLET: 500 TABLET, EXTENDED RELEASE ORAL at 08:32

## 2020-12-11 RX ADMIN — SODIUM CHLORIDE, PRESERVATIVE FREE 10 ML: 5 INJECTION INTRAVENOUS at 08:30

## 2020-12-11 RX ADMIN — DOCUSATE SODIUM 100 MG: 100 CAPSULE ORAL at 08:31

## 2020-12-11 ASSESSMENT — PAIN SCALES - GENERAL: PAINLEVEL_OUTOF10: 0

## 2020-12-11 NOTE — FLOWSHEET NOTE
Dr. Caleb Wilhelm called and notified that this RN spoke to BRADEN Tracy who states that she will discontinue patient's ferrous sulfate order while patient is receiving FerrLecit IV.

## 2020-12-11 NOTE — PROGRESS NOTES
Subjective: The patient is awake and alert in bed, no complaints this morning. Her hemoglobin is still 5.7 but denies any SOB, headaches, dizziness, CP. Vitals are stable. She states she has been up and walking in the room and denies any dizziness with walking. Objective:    /64   Pulse 68   Temp 97.3 °F (36.3 °C) (Oral)   Resp 18   Ht 5' 2\" (1.575 m)   Wt 156 lb (70.8 kg)   LMP 03/10/2020   SpO2 99%   Breastfeeding Unknown   BMI 28.53 kg/m²     General: NAD, pleasant  HEENT: No thrush or mucositis, EOMI, PERRLA  Heart:  RRR, no murmurs, gallops, or rubs.   Lungs:  CTA bilaterally, no wheeze, rales or rhonchi  Abd: bowel sounds present, nontender, nondistended, no masses  Extrem:  No clubbing, cyanosis, or edema  Lymphatics: No palpable adenopathy in cervical and supraclavicular regions  Skin: Intact, no petechia or purpura    CBC with Differential:    Lab Results   Component Value Date    WBC 16.7 12/10/2020    RBC 2.91 12/10/2020    HGB 5.7 12/11/2020    HCT 20.9 12/11/2020     12/10/2020    MCV 71.8 12/10/2020    MCH 19.9 12/10/2020    MCHC 27.8 12/10/2020    RDW 20.9 12/10/2020    LYMPHOPCT 18.3 01/08/2019    MONOPCT 5.3 01/08/2019    BASOPCT 0.3 01/08/2019    MONOSABS 0.38 01/08/2019    LYMPHSABS 1.31 01/08/2019    EOSABS 0.02 01/08/2019    BASOSABS 0.02 01/08/2019     CMP:    Lab Results   Component Value Date     01/14/2019    K 3.0 01/14/2019     01/14/2019    CO2 24 01/14/2019    BUN 8 01/14/2019    CREATININE 0.5 01/14/2019    GFRAA >60 01/14/2019    LABGLOM >60 01/14/2019    GLUCOSE 109 01/14/2019    PROT 6.9 01/08/2019    LABALBU 3.4 01/08/2019    CALCIUM 8.9 01/14/2019    BILITOT 0.2 01/08/2019    ALKPHOS 64 01/08/2019    AST 17 01/08/2019    ALT 8 01/08/2019         Current Facility-Administered Medications:     [COMPLETED] ferric gluconate (FERRLECIT) 25 mg in sodium chloride 0.9 % 50 mL (test dose) IVPB, 25 mg, Intravenous, Once, Stopped at 12/10/20 152 600 mg, Oral, Q6H PRN, Luis Miguel Caceres MD    influenza quadrivalent split vaccine (FLUZONE;FLUARIX;FLULAVAL;AFLURIA) injection 0.5 mL, 0.5 mL, Intramuscular, Once, Luis Miguel Caceres MD    witch hazel-glycerin (TUCKS) pad, , Topical, PRN, Luis Miguel Caceres MD    methylergonovine (METHERGINE) injection 200 mcg, 200 mcg, Intramuscular, PRN, Luis Miguel Caceres MD    oxytocin (PITOCIN) 30 units in 500 mL infusion, 87.3 cam-units/min, Intravenous, PRN, Luis Miguel Caceres MD, Stopped at 20 2235    oxytocin (PITOCIN) 10 unit bolus from the bag, 10 Units, Intravenous, PRN, Luis Miguel Caceres MD    Assessment:    Active Problems:    Anemia associated with acute blood loss     (normal spontaneous vaginal delivery)    Term birth of  female    Obstetric left labial laceration, delivered, current hospitalization    Nausea and vomiting during pregnancy prior to 22 weeks gestation    44 1/7 weeks gestation of pregnancy    High risk multigravida, third trimester    Thrombophilia affecting pregnancy in third trimester, antepartum (on 81 mg ASA, FA/B6/B12 added lovenox)    MTHFR mutation ([facility] [address] [facility] 677, 1298 hetero    Protein C deficiency affecting pregnancy ([address]    Anticardiolipin antibody affecting pregnancy, antepartum (elevated IgM)    Maternal antithrombin III deficiency complicating pregnancy ([address]    History of recurrent  (x3), currently pregnant in third trimester    Anemia affecting pregnancy in third trimester    Patient receiving subcutaneous low molecular weight heparin    Echogenic focus of heart of fetus affecting antepartum care of mother  Resolved Problems:    * No resolved hospital problems. *    68-year-old pregnant female with severe iron deficiency anemia was found to have MTHFR mutation known to . She did receive one dose of IV Ferrlecit on . Labs prior to delivery were 7.8, 26.7, MCV 69.7, platelets 417.  Labs today hemoglobin 5.8, hematocrit 20.9, MCV 71.8, platelets 829. Plan:  Monitor CBC daily  We will give 3 doses of IV Ferrlecit for support, 2nd dose to be given today  Since, she is post delivery monitor closely as her blood counts should start to recover   Will discontinue PO iron, okay if dose was given in A.M  Patient currently is asymptomatic, vital signs stable. She is to follow up at the [facility] early next week for repeat CBC, patient aware and please put in discharge instructions as a reminder! Discussed with    Full supportive care   Electronically signed by CLEMENTE Troy NP on 12/11/2020 at 7:51 AM     Attending Addendum:     Patient seen and examined personally. Reviewed pertinent labs and imaging reports. Progress note has been updated to reflect my changes. Agree with the note above. Is doing well today and remains asymptomatic. Proceed with Ferrlecit second dose today. Can hold off on transfusion support as she is asymptomatic. Can hold off on oral iron sulfate due to side effects of constipation and since she is already on IV iron. I will plan to continue IV iron for 1 more day tomorrow. She can be discharged as long as blood counts are stable or improved. Follow-up as outpatient for remaining Ferrlecit and to monitor her CBC next week.       Birgit Nath MD  Medical Oncologist/Hematologist  [facility] for [address] [facility]

## 2020-12-11 NOTE — PROGRESS NOTES
PPD #2    Patient:  Deysi Keen Date:  12/9/2020  8:12 AM  Medical Record Number:  [id number]   Today's Date: 12/11/2020    S: No complaints -feels great, has had no further dizzy spells -bleeding much less than a period; tolerating diet: yes -general; ambulating well: yes -up in chairs and in halls; voiding without difficulty:  yes -has no urinary complaints; bm: denies urge; flatus: yes -normal; pain meds appropriate: Has required none; vaginal bleeding: Less than a period.     O:   Vitals:    12/10/20 1502 12/10/20 1550 12/10/20 1857 12/11/20 0710   BP: 109/60 (!) 123/58 119/72 132/64   Pulse: 90 87 89 68   Resp: 16 14 16 18   Temp: 98.7 °F (37.1 °C) 98.3 °F (36.8 °C) 97.3 °F (36.3 °C) 97.3 °F (36.3 °C)   TempSrc: Oral  Oral Oral   SpO2:       Weight:       Height:         Gen: A&O, cooperative, pleasant   Heart: RRR   Lungs: CTA b/l   Abd; soft, NT, non distended, +BS  Back: no CVA or paraspinal tenderness   Ext: No clubbing, cyanosis, edema:no , no cords palpable, no calf tenderness   Neuro: intact   Uterus: firm, well contracted, nt   Perineum: intact, healing well   Irina pad: staining only, thin lochia    Lab Results   Component Value Date    HGB 5.7 (L) 12/11/2020    HCT 20.9 (L) 12/11/2020      Recent Results (from the past 72 hour(s))   APTT    Collection Time: 12/09/20  8:49 AM   Result Value Ref Range    aPTT 31.3 24.5 - 35.1 sec   Protime-INR    Collection Time: 12/09/20  8:49 AM   Result Value Ref Range    Protime 10.9 9.3 - 12.4 sec    INR 0.9    CBC    Collection Time: 12/09/20  8:49 AM   Result Value Ref Range    WBC 8.4 4.5 - 11.5 E9/L    RBC 3.83 3.50 - 5.50 E12/L    Hemoglobin 7.8 (L) 11.5 - 15.5 g/dL    Hematocrit 26.7 (L) 34.0 - 48.0 %    MCV 69.7 (L) 80.0 - 99.9 fL    MCH 20.4 (L) 26.0 - 35.0 pg    MCHC 29.2 (L) 32.0 - 34.5 %    RDW 21.2 (H) 11.5 - 15.0 fL    Platelets 294 846 - 324 E9/L    MPV 9.9 7.0 - 12.0 fL   TYPE AND SCREEN    Collection Time: 12/09/20  8:49 AM   Result Value Ref Range    ABO/Rh A POS     Antibody Screen NEG    Urine Drug Screen    Collection Time: 20  8:49 AM   Result Value Ref Range    Amphetamine Screen, Urine NOT DETECTED Negative <1000 ng/mL    Barbiturate Screen, Ur NOT DETECTED Negative < 200 ng/mL    Benzodiazepine Screen, Urine NOT DETECTED Negative < 200 ng/mL    Cannabinoid Scrn, Ur NOT DETECTED Negative < 50ng/mL    Cocaine Metabolite Screen, Urine NOT DETECTED Negative < 300 ng/mL    Opiate Scrn, Ur NOT DETECTED Negative < 300ng/mL    PCP Screen, Urine NOT DETECTED Negative < 25 ng/mL    Methadone Screen, Urine NOT DETECTED Negative <300 ng/mL    Oxycodone Urine NOT DETECTED Negative <100 ng/mL    FENTANYL SCREEN, URINE NOT DETECTED Negative <1 ng/mL    Drug Screen Comment: see below    HEMOGLOBIN AND HEMATOCRIT, BLOOD    Collection Time: 20  7:19 PM   Result Value Ref Range    Hemoglobin 7.3 (L) 11.5 - 15.5 g/dL    Hematocrit 25.9 (L) 34.0 - 48.0 %   CBC    Collection Time: 12/10/20  3:53 AM   Result Value Ref Range    WBC 16.7 (H) 4.5 - 11.5 E9/L    RBC 2.91 (L) 3.50 - 5.50 E12/L    Hemoglobin 5.8 (L) 11.5 - 15.5 g/dL    Hematocrit 20.9 (L) 34.0 - 48.0 %    MCV 71.8 (L) 80.0 - 99.9 fL    MCH 19.9 (L) 26.0 - 35.0 pg    MCHC 27.8 (L) 32.0 - 34.5 %    RDW 20.9 (H) 11.5 - 15.0 fL    Platelets 990 744 - 462 E9/L    MPV 9.9 7.0 - 12.0 fL   Hemoglobin and hematocrit, blood    Collection Time: 20  3:50 AM   Result Value Ref Range    Hemoglobin 5.7 (L) 11.5 - 15.5 g/dL    Hematocrit 20.9 (L) 34.0 - 48.0 %       A: 26 y.o. female [id number] at 39w1d weeks  PPD #2 S/P ; Episiotomy was not needed due to a spontaneous left labial  Laceration  Acute blood loss anemia superimposed on chronic iron deficiency anemia -H&H 5.7/20.9 decreased from 7.8 to 26.7 on admission -patient asymptomatic and hemodynamically stable-appreciate input from [facility] -patient to receive second dose of IV ferric gluconate (Ferrlecit) today, okay for discharge after third dose tomorrow and to follow-up with [facility] in 1 week. Patient Active Problem List   Diagnosis    Nausea and vomiting during pregnancy prior to 22 weeks gestation    39 1/7 weeks gestation of pregnancy    High risk multigravida, third trimester    Thrombophilia affecting pregnancy in third trimester, antepartum (on 81 mg ASA, FA/B6/B12 added lovenox)    MTHFR mutation ([facility] [address] [facility] 677, 1298 hetero    Protein C deficiency affecting pregnancy ([facility]    Anticardiolipin antibody affecting pregnancy, antepartum (elevated IgM)    Maternal antithrombin III deficiency complicating pregnancy ([facility]    History of recurrent  (x3), currently pregnant in third trimester    Anemia affecting pregnancy in third trimester    Patient receiving subcutaneous low molecular weight heparin    Echogenic focus of heart of fetus affecting antepartum care of mother     (normal spontaneous vaginal delivery)    Term birth of  female   [facility] Obstetric left labial laceration, delivered, current hospitalization    Anemia associated with acute blood loss       P: Routine PP care. Management of anemia as per [facility] - patient to receive second dose of IV ferric gluconate (Ferrlecit) today, okay for discharge after third dose tomorrow and to follow-up with [facility] in 1 week. Continue Lovenox 40 sq daily, 81mg ASA, B6/B12/FA for complex thrombophilia x 8 weeks.   Advance diet and activity as tolerated - use caution with activity (position changes, avoid straining with BM and hot showers etc)  Patient to monitor for s/s of severe anemia - HA/CP/SOB/light headed-dizziness etc  Repeat H&H in [address], [place] [address] [facility]  2020 9:26 AM

## 2020-12-11 NOTE — FLOWSHEET NOTE
This RN called BRADEN Daniels, and notified her that patient received ferrous sulfate at 0831 today and is scheduled to receive FerrLecit 125 mg IV today. BRADEN Ruby states that she will discontinue order for ferrous sulfate while patient receives FerrLecit IV.

## 2020-12-12 VITALS
WEIGHT: 156 LBS | DIASTOLIC BLOOD PRESSURE: 66 MMHG | OXYGEN SATURATION: 99 % | HEIGHT: 62 IN | TEMPERATURE: 98.6 F | HEART RATE: 71 BPM | RESPIRATION RATE: 16 BRPM | BODY MASS INDEX: 28.71 KG/M2 | SYSTOLIC BLOOD PRESSURE: 119 MMHG

## 2020-12-12 LAB
HCT VFR BLD CALC: 21.6 % (ref 34–48)
HEMOGLOBIN: 6 G/DL (ref 11.5–15.5)

## 2020-12-12 PROCEDURE — 6360000002 HC RX W HCPCS: Performed by: OBSTETRICS & GYNECOLOGY

## 2020-12-12 PROCEDURE — 90707 MMR VACCINE SC: CPT | Performed by: OBSTETRICS & GYNECOLOGY

## 2020-12-12 PROCEDURE — 85014 HEMATOCRIT: CPT

## 2020-12-12 PROCEDURE — 2580000003 HC RX 258: Performed by: OBSTETRICS & GYNECOLOGY

## 2020-12-12 PROCEDURE — 36415 COLL VENOUS BLD VENIPUNCTURE: CPT

## 2020-12-12 PROCEDURE — 6360000002 HC RX W HCPCS: Performed by: NURSE PRACTITIONER

## 2020-12-12 PROCEDURE — 85018 HEMOGLOBIN: CPT

## 2020-12-12 PROCEDURE — 6370000000 HC RX 637 (ALT 250 FOR IP): Performed by: OBSTETRICS & GYNECOLOGY

## 2020-12-12 PROCEDURE — 2580000003 HC RX 258: Performed by: NURSE PRACTITIONER

## 2020-12-12 RX ADMIN — MEASLES, MUMPS, AND RUBELLA VIRUS VACCINE LIVE 0.5 ML: 1000; 12500; 1000 INJECTION, POWDER, LYOPHILIZED, FOR SUSPENSION SUBCUTANEOUS at 13:09

## 2020-12-12 RX ADMIN — SODIUM CHLORIDE 125 MG: 900 INJECTION INTRAVENOUS at 11:00

## 2020-12-12 RX ADMIN — ASPIRIN 81 MG CHEWABLE TABLET 81 MG: 81 TABLET CHEWABLE at 08:47

## 2020-12-12 RX ADMIN — METFORMIN HYDROCHLORIDE 1 TABLET: 500 TABLET, EXTENDED RELEASE ORAL at 08:46

## 2020-12-12 RX ADMIN — SODIUM CHLORIDE, PRESERVATIVE FREE 10 ML: 5 INJECTION INTRAVENOUS at 08:45

## 2020-12-12 RX ADMIN — DOCUSATE SODIUM 100 MG: 100 CAPSULE ORAL at 08:46

## 2020-12-12 RX ADMIN — FOLIC ACID 2 MG: 1 TABLET ORAL at 08:46

## 2020-12-12 RX ADMIN — BENZOCAINE AND LEVOMENTHOL: 200; 5 SPRAY TOPICAL at 08:45

## 2020-12-12 RX ADMIN — PYRIDOXINE HCL TAB 50 MG 25 MG: 50 TAB at 08:46

## 2020-12-12 RX ADMIN — CYANOCOBALAMIN TAB 1000 MCG 1000 MCG: 1000 TAB at 08:56

## 2020-12-12 RX ADMIN — ENOXAPARIN SODIUM 40 MG: 40 INJECTION SUBCUTANEOUS at 08:51

## 2020-12-12 RX ADMIN — IBUPROFEN 600 MG: 600 TABLET, FILM COATED ORAL at 08:46

## 2020-12-12 ASSESSMENT — PAIN DESCRIPTION - FREQUENCY
FREQUENCY: INTERMITTENT
FREQUENCY: INTERMITTENT

## 2020-12-12 ASSESSMENT — PAIN DESCRIPTION - PAIN TYPE
TYPE: ACUTE PAIN
TYPE: ACUTE PAIN

## 2020-12-12 ASSESSMENT — PAIN DESCRIPTION - LOCATION
LOCATION: BACK
LOCATION: BACK

## 2020-12-12 ASSESSMENT — PAIN DESCRIPTION - ORIENTATION
ORIENTATION: LOWER
ORIENTATION: LOWER

## 2020-12-12 ASSESSMENT — PAIN DESCRIPTION - PROGRESSION
CLINICAL_PROGRESSION: GRADUALLY IMPROVING
CLINICAL_PROGRESSION: NOT CHANGED

## 2020-12-12 ASSESSMENT — PAIN DESCRIPTION - DESCRIPTORS
DESCRIPTORS: SORE
DESCRIPTORS: SORE

## 2020-12-12 ASSESSMENT — PAIN - FUNCTIONAL ASSESSMENT
PAIN_FUNCTIONAL_ASSESSMENT: ACTIVITIES ARE NOT PREVENTED

## 2020-12-12 ASSESSMENT — PAIN SCALES - GENERAL
PAINLEVEL_OUTOF10: 5
PAINLEVEL_OUTOF10: 1

## 2020-12-12 NOTE — DISCHARGE SUMMARY
Department of Obstetrics and Gynecology  Labor and Delivery  Discharge Summary    Patient:  Julia Keith         Medical Record Number:  [id number]    Admit Date:  2020  8:12 AM    Discharge Date: 2020    Final Diagnosis:   Active Problems:    44 1/7 weeks gestation of pregnancy    High risk multigravida, third trimester    Anemia associated with acute blood loss    Thrombophilia affecting pregnancy in third trimester, antepartum (on 81 mg ASA, FA/B6/B12 added lovenox)    MTHFR mutation ([facility] [address] 677, 1298 hetero    Protein C deficiency affecting pregnancy ([address]    Anticardiolipin antibody affecting pregnancy, antepartum (elevated IgM)    Maternal antithrombin III deficiency complicating pregnancy ([facility] [address]    Patient receiving subcutaneous low molecular weight heparin     (normal spontaneous vaginal delivery)    Term birth of  female    Obstetric left labial laceration, delivered, current hospitalization    Nausea and vomiting during pregnancy prior to 22 weeks gestation    History of recurrent  (x3), currently pregnant in third trimester    Anemia affecting pregnancy in third trimester    Echogenic focus of heart of fetus affecting antepartum care of mother  Resolved Problems:    * No resolved hospital problems. *    Chief Complaint - Presenting Illness - Physical Findings:   39 weeks gestation of pregnancy [Z3A.39]  HPI: The patient is a 26 y.o. AA female [id number] at 39w1d. Patient presents with a a history of complex thrombophilia affecting pregnancy (MTHFR 800/4461 heterozygote, protein C deficiency, anticardiolipin antibodies, antithrombin 3 deficiency) -managed on 40 mg subcutaneous Lovenox daily -  for Pitocin induction of labor. Sotelo score = 8. Patient also has significant iron deficiency anemia pregnancy (36-week H&H 7.3/25.9). Patient has mild Collier Carreon contractions off and on. She denies leaking fluid, vaginal bleeding. No symptoms of UTI or PIH.   There is good fetal movement.     Hospital Course:   Delivery Summary:  Procedure Date: 2020 7:27 PM      Pre-delivery Diagnosis:  High risk multigravid at 44 1/7 weeks,  Thrombophilia affecting pregnancy (MTHFR 677/1298 heterozygote, protein C deficiency, anticardiolipin antibodies, antithrombin 3 deficiency) - on Lovenox; h/o recurrent , anemia in pregancy      Patient Active Problem List   Diagnosis    Nausea and vomiting during pregnancy prior to 22 weeks gestation    39 1/7 weeks gestation of pregnancy    High risk multigravida, third trimester    Thrombophilia affecting pregnancy in third trimester, antepartum (on 81 mg ASA, FA/B6/B12 added lovenox)    MTHFR mutation ([address] 677, 1298 hetero    Protein C deficiency affecting pregnancy ([address]    Anticardiolipin antibody affecting pregnancy, antepartum (elevated IgM)    Maternal antithrombin III deficiency complicating pregnancy ([address]    History of recurrent  (x3), currently pregnant in third trimester    Anemia affecting pregnancy in third trimester    Patient receiving subcutaneous low molecular weight heparin    Echogenic focus of heart of fetus affecting antepartum care of mother      Post-delivery Diagnosis:  Living  infant Female      Patient Active Problem List   Diagnosis    Nausea and vomiting during pregnancy prior to 22 weeks gestation    44 1/7 weeks gestation of pregnancy    High risk multigravida, third trimester    Thrombophilia affecting pregnancy in third trimester, antepartum (on 81 mg ASA, FA/B6/B12 added lovenox)    MTHFR mutation ([address] 677, 1298 hetero    Protein C deficiency affecting pregnancy ([address]    Anticardiolipin antibody affecting pregnancy, antepartum (elevated IgM)    Maternal antithrombin III deficiency complicating pregnancy ([address]    History of recurrent  (x3), currently pregnant in third trimester    Anemia affecting pregnancy in third trimester    Patient receiving subcutaneous low molecular weight heparin    Echogenic focus of heart of fetus affecting antepartum care of mother     (normal spontaneous vaginal delivery)    Term birth of  female   [facility] Obstetric left labial laceration, delivered, current hospitalization      Information for the patient's :  Steph Valverde [[id number]]   Normal spontaneous Vaginal Delivery -uncomplicated  Delivering Clinician: Glo Rivera Wt:   Information for the patient's :  Steph Valverde [[id number]]      APGARS:         Information for the patient's :  Steph Valverde [[id number]]   1 minute: 9  5 minute: 9\  Anesthesia:  epidural anesthesia     Application and Delivery:  26 y.o. AA female  at 39w1d admitted with Sotelo score = 8 for a history of complex thrombophilia affecting pregnancy (MTHFR 677/1298 heterozygote, protein C deficiency, anticardiolipin antibodies, antithrombin 3 deficiency) -managed on 40 mg subcutaneous Lovenox daily - who progressed to complete and delivered Cephalic, right occiput anterior presentation via  @1841, under epidural anesthesia anesthesia,  over an intact perineum with a resulting 2.5 cm left labial laceration, without dystocia or complication, a Live Born Female infant, weighing 6# 11oz, 3020 grams, Clear fluid at delivery, bulb suctioned on perineum. A nuchal cord was not present. Spontaneous cry,  Apgar's 1 minute:  9; 5 minute:  9. The placenta was delivered with gentle traction and was noted to be intact, whole and that the umbilical cord had three vessels noted. Episiotomy was not needed due to a spontaneous left labial  laceration. Repair performed with 4 simple interrupted stitches, per routine with  Vicryl 3-0 suture. Cervix, rectum, sphincter intact. Sponge, needle, and instrument counts correct x 2. Patient got lightheaded after delivery once she was cleaned up. She received a fluid bolus and recovered rapidly. Her vital signs remained stable.   Stat H&H was 7.3/25. 9. Her admission H&H was 7.6/26.7.   An H&H will be repeated in the morning.     Delivery Summary:  Mother's Information    Labor Events     labor?:  No  Rupture type:  Artificial=AROM  Fluid color:  Clear  Fluid odor:  None      Mother Delivery Information    Episiotomy:  None  Lacerations:  None  # of Repair Packets:  1  Vaginal Delivery Est. Blood Loss (mL):  250  Surgical or Additional Est. Blood Loss (mL):  0 (View Only):  Edit in Flowsheets   Combined Est. Blood Loss (mL):  250      Cross, Baby Girl Rosanne Velazquez [[id number]]    Labor Events     labor?:  No   steroids?:  None  Cervical ripening date/time:       Antibiotics received during labor?:  No  Rupture date/time: 20 13:57:00   Rupture type:  Artificial=AROM  Fluid color:  Clear  Fluid odor:  None  Induction:  Oxytocin  Indications for induction:  Medical  Augmentation:  AROM  Indications for augmentation:  Ineffective Contraction Pattern  Labor complications:  None            Additional complications:   OB: DELIVERY - COMPLICATIONS   Anemia complicating pregnancy       Labor Event Times    Labor onset date/time: 20 1357   Dilation complete date/time:   20   Start pushin2020       Anesthesia    Method:  Epidural      Assisted Delivery Details    Forceps attempted?:  No  Vacuum extractor attempted?:  No      Shoulder Dystocia    Shoulder dystocia present?:  No       Presentation    Presentation:  Vertex  Position:  Right  _:  Occiput  _:  Anterior       Information    Head delivery date/time:  2020 18:41:00         Changing the 's delivery date/time could affect patient care.:     Delivery date/time:   20   Delivery type:  Vaginal, Spontaneous      Delivery Providers    Delivering clinician:  Adan Avila MD    Provider Role     Yessy Bustos Registered Nurse     Angela Chaudhary, RN Registered Nurse     Kristy Quan, RN Registered Nurse     Cord    Vessels:  3 Vessels  Complications:  None  Delayed cord clamping?:  Yes  Cord clamped date/time:  2020 1843  Cord blood disposition:  Lab  Gases sent?:  No  Stem cell collection (by provider): No      Placenta    Date/time:  2020 18:48:00  Removal:  Spontaneous  Appearance:  Intact  Disposition:  Refrigerator      Delivery Resuscitation    Method:  Bulb Suction, Stimulation      Apgars    Living status:  Living  Apgars    1 Minute:   5 Minute:   10 Minute 15 Minute 20 Minute   Skin Color: 1  1          Heart Rate: 2  2          Reflex Irritability: 2  2          Muscle Tone: 2  2          Respiratory Effort: 2  2          Total: 9  9                        Apgars Assigned ByShawn Trujillo RN    Skin to Skin    Skin to skin initiation date/time: 20 18:41:00   Skin to skin with: Mother        Skin to skin end date/time: 20 18:42:00                Measurements    Weight:  3020 g Length:  48.3 cm   Head circumference:  32 cm         Delivery Information    Episiotomy:  None  Perineal lacerations:  None     Labial laceration:  left Repaired:  Yes   Cervical laceration:  No     Vaginal delivery est. blood loss (mL):  250  Surgical or additional est. blood loss (mL):  0 (View Only):  Edit in Flowsheets   Combined est. blood loss (mL):  250      Vaginal Delivery Counts    Initial count personnel:  ESTHER  Initial count verified by:  FROST    4x4:   Needles:   Instruments:   Lap Pads:   Sponges:     Initial counts:               Final counts:               Final count personnel:  Svitlana Lopez  Final count verified by:  Sabi Caicedo  Accurate final count?:  Yes  Final vaginal sweep completed:   Yes      Other Procedures    Procedures:  None      Labor Length    1st stage:  4h 32m  2nd stage:  0h 12m  3rd stage:  0h 07m     Specimen:  None                Estimated blood loss: EBL (mL): 250(Filed from Delivery Summary)        Condition:  infant stable to general nursery and mother stable to - 48.0 %   Hemoglobin and hematocrit, blood    Collection Time: 12/12/20  4:55 AM   Result Value Ref Range    Hemoglobin 6.0 (L) 11.5 - 15.5 g/dL    Hematocrit 21.6 (L) 34.0 - 48.0 %     Physicians Following Patient During Hospitalization - Reason For Care:  Admitting Physician: Reji Cameron  Delivering Physician: Francisco Candelaria Physician(s):    PP#1 Glo   PP#2 Glo   PP#3 Reji Cameron  Discharging Physician: Reji Cameron  Consultant(s): Nohelia Todd MD    Medical Oncologist/Hematologist    [facility]    Discharge Condition:  · Level of Function:  Stable  · Caregiver Arrangements and Educational Efforts: Written Discharge Instructions were verbally reviewed and given to the Patient. · Special Problems: Follow-up with the [facility] for [facility] in 1 week's time for CBC. Plans For Continuing Care:  · Unreported Test Results and Intended Follow-Up: 6 week(s) time. · Instructions for Physical Activity: No driving for 1 week(s). No sex or tampon use for 6 weeks. No douching. No heavy lifting (greater than baby and carrier) for 6 weeks. Frequent ambulation with stairs - start slowly then increase as tolerated. Return to work in 6 weeks. Showers are fine - avoid bath tubs, hot tubs, swimming. · Instructions for Diet: Regular diet  · Discharge Medications:  Current Discharge Medication List      START taking these medications    Details   ibuprofen (ADVIL;MOTRIN) 600 MG tablet Take 1 tablet by mouth every 6 hours as needed for Pain  Qty: 30 tablet, Refills: 0      benzocaine-menthol (DERMOPLAST) 20-0.5 % AERO spray Apply topically as needed for Pain . May use hospital sample as needed at home. Qty:  , Refills: 0      lansinoh lanolin CREA ointment Apply 1 applicator topically as needed for Dry Skin (nipple discomfort)      witch hazel-glycerin (TUCKS) pad Place rectally as needed.   Qty:  , Refills: 0      docusate sodium (COLACE, DULCOLAX) 100 MG CAPS Take 100 mg by mouth 2 times daily as needed for Constipation         CONTINUE these medications which have NOT CHANGED    Details   aspirin (ASPIRIN CHILDRENS) 81 MG chewable tablet Take 1 tablet by mouth daily  Qty: 30 tablet, Refills: 12      folic acid (FOLVITE) 1 MG tablet Take 2 tablets by mouth daily  Qty: 60 tablet, Refills: 12      Cyanocobalamin (B-12) 1000 MCG TABS Take 1 tablet by mouth daily  Qty: 30 tablet, Refills: 12      Prenatal Vit-Fe Fumarate-FA (PRENATAL VITAMINS) 28-0.8 MG TABS Take 1 tablet by mouth daily  Qty: 30 tablet, Refills: 11      enoxaparin (LOVENOX) 40 MG/0.4ML injection Inject 0.4 mLs into the skin daily  Qty: 30 Syringe, Refills: 3      pyridoxine (B-6) 25 MG tablet Take 1 tablet by mouth daily  Qty: 30 tablet, Refills: 12         STOP taking these medications       ferrous sulfate (IRON 325) 325 (65 Fe) MG tablet Comments:   Reason for Stopping:         doxyLAMINE succinate (GNP SLEEP AID) 25 MG tablet Comments:   Reason for Stopping:              Follow-Up Visit Plan: In 1 week at the [facility] to follow-up anemia. In 6 weeks for final postpartum visit.  Disposition: Home. Time Spent on Discharge:  30 minutes were spent in patient examination, evaluation, counseling as well as medication reconciliation, prescriptions for required medications, discharge plan and follow up.       Freddie Ayoub MD,FACOG    12/12/2020 12:35 PM     .ppd

## 2020-12-12 NOTE — PROGRESS NOTES
PPD #3     Patient:  Rudolph Escalante Date:  12/9/2020  8:12 AM  Medical Record Number:  [id number]   Today's Date: 12/12/2020    S: No complaints; tolerating diet: yes -general; ambulating well: yes -up in room and in halls; voiding without difficulty:  yes -no urinary complaints; bm: yes -normal; flatus: yes -normal; pain meds appropriate: Tylenol once and ibuprofen 600 mg once; vaginal bleeding: less than a period.     O:   Vitals:    12/10/20 1857 12/11/20 0710 12/11/20 1930 12/12/20 0730   BP: 119/72 132/64 114/63 119/66   Pulse: 89 68 70 71   Resp: 16 18 16 16   Temp: 97.3 °F (36.3 °C) 97.3 °F (36.3 °C) 98.8 °F (37.1 °C) 98.6 °F (37 °C)   TempSrc: Oral Oral Oral Oral   SpO2:       Weight:       Height:         Gen: A&O, cooperative, pleasant   Heart: RRR   Lungs: CTA b/l   Abd; soft, NT, non distended, +BS  Back: no CVA or paraspinal tenderness   Ext: No clubbing, cyanosis, edema:no , no cords palpable, no calf tenderness   Neuro: intact   Uterus: firm, well contracted, nt   Perineum: intact, healing well   Irina pad: staining only, thin lochia    Lab Results   Component Value Date    HGB 6.0 (L) 12/12/2020    HCT 21.6 (L) 12/12/2020      Recent Results (from the past 72 hour(s))   HEMOGLOBIN AND HEMATOCRIT, BLOOD    Collection Time: 12/09/20  7:19 PM   Result Value Ref Range    Hemoglobin 7.3 (L) 11.5 - 15.5 g/dL    Hematocrit 25.9 (L) 34.0 - 48.0 %   CBC    Collection Time: 12/10/20  3:53 AM   Result Value Ref Range    WBC 16.7 (H) 4.5 - 11.5 E9/L    RBC 2.91 (L) 3.50 - 5.50 E12/L    Hemoglobin 5.8 (L) 11.5 - 15.5 g/dL    Hematocrit 20.9 (L) 34.0 - 48.0 %    MCV 71.8 (L) 80.0 - 99.9 fL    MCH 19.9 (L) 26.0 - 35.0 pg    MCHC 27.8 (L) 32.0 - 34.5 %    RDW 20.9 (H) 11.5 - 15.0 fL    Platelets 488 654 - 698 E9/L    MPV 9.9 7.0 - 12.0 fL   Hemoglobin and hematocrit, blood    Collection Time: 12/11/20  3:50 AM   Result Value Ref Range    Hemoglobin 5.7 (L) 11.5 - 15.5 g/dL    Hematocrit 20.9 (L) 34.0 - 48.0 % Hemoglobin and hematocrit, blood    Collection Time: 20  4:55 AM   Result Value Ref Range    Hemoglobin 6.0 (L) 11.5 - 15.5 g/dL    Hematocrit 21.6 (L) 34.0 - 48.0 %       A: 26 y.o. female [id number] at 39w1d weeks  PPD #3 S/P ; Episiotomy was not needed due to a spontaneous left labial laceration  Acute blood loss anemia superimposed on chronic iron deficiency anemia -H&H 6.0/21.6 today decreased from 7.8 to 26.7 on admission -patient asymptomatic and hemodynamically stable-appreciate input from [facility] -patient s/p third dose of IV ferric gluconate (Ferrlecit) today, okay for discharge and to follow-up with [facility] in 1 week. Patient Active Problem List   Diagnosis    Nausea and vomiting during pregnancy prior to 22 weeks gestation    39 1/7 weeks gestation of pregnancy    High risk multigravida, third trimester    Thrombophilia affecting pregnancy in third trimester, antepartum (on 81 mg ASA, FA/B6/B12 added lovenox)    MTHFR mutation ([facility] [address] 677, 1298 hetero    Protein C deficiency affecting pregnancy ([address]    Anticardiolipin antibody affecting pregnancy, antepartum (elevated IgM)    Maternal antithrombin III deficiency complicating pregnancy ([facility]    History of recurrent  (x3), currently pregnant in third trimester    Anemia affecting pregnancy in third trimester    Patient receiving subcutaneous low molecular weight heparin    Echogenic focus of heart of fetus affecting antepartum care of mother     (normal spontaneous vaginal delivery)    Term birth of  female   [facility] Obstetric left labial laceration, delivered, current hospitalization    Anemia associated with acute blood loss       P: Routine PP care. Home today with instructions and precautions. Continue Prenatal Vitamins with iron. Rx Motrin 600 mg and over-the-counter Tylenol as needed.   Use caution at home -especially with activities that require position changes, straining (with BM) and hot showers etc.  Patient to monitor for s/s of severe anemia - HA/CP/SOB/light headed-dizziness etc and call immediately if needed. Follow-up in the [facility] in 1 week's time. Follow up in office 6 weeks postpartum.     Talya Watters MD [id number] [facility]  12/12/2020 12:27 PM

## 2020-12-12 NOTE — PROGRESS NOTES
Subjective: The patient is awake and alert in bed, no complaints this morning. Her hemoglobin is slightly improved to 6 g/dL from 5.7. She denies any SOB, headaches, dizziness, CP. Vitals are stable. Denies any dizziness with walking. Objective:    /66   Pulse 71   Temp 98.6 °F (37 °C) (Oral)   Resp 16   Ht 5' 2\" (1.575 m)   Wt 156 lb (70.8 kg)   LMP 03/10/2020   SpO2 99%   Breastfeeding Unknown   BMI 28.53 kg/m²     General: NAD, pleasant  HEENT: No thrush or mucositis, EOMI, PERRLA  Heart:  RRR, no murmurs, gallops, or rubs.   Lungs:  CTA bilaterally, no wheeze, rales or rhonchi  Abd: bowel sounds present, nontender, nondistended, no masses  Extrem:  No clubbing, cyanosis, or edema  Lymphatics: No palpable adenopathy in cervical and supraclavicular regions  Skin: Intact, no petechia or purpura    CBC with Differential:    Lab Results   Component Value Date    WBC 16.7 12/10/2020    RBC 2.91 12/10/2020    HGB 6.0 12/12/2020    HCT 21.6 12/12/2020     12/10/2020    MCV 71.8 12/10/2020    MCH 19.9 12/10/2020    MCHC 27.8 12/10/2020    RDW 20.9 12/10/2020    LYMPHOPCT 18.3 01/08/2019    MONOPCT 5.3 01/08/2019    BASOPCT 0.3 01/08/2019    MONOSABS 0.38 01/08/2019    LYMPHSABS 1.31 01/08/2019    EOSABS 0.02 01/08/2019    BASOSABS 0.02 01/08/2019     CMP:    Lab Results   Component Value Date     01/14/2019    K 3.0 01/14/2019     01/14/2019    CO2 24 01/14/2019    BUN 8 01/14/2019    CREATININE 0.5 01/14/2019    GFRAA >60 01/14/2019    LABGLOM >60 01/14/2019    GLUCOSE 109 01/14/2019    PROT 6.9 01/08/2019    LABALBU 3.4 01/08/2019    CALCIUM 8.9 01/14/2019    BILITOT 0.2 01/08/2019    ALKPHOS 64 01/08/2019    AST 17 01/08/2019    ALT 8 01/08/2019         Current Facility-Administered Medications:     aspirin chewable tablet 81 mg, 81 mg, Oral, Daily, Chika Judge MD, 81 mg at 12/12/20 0847    vitamin B-12 (CYANOCOBALAMIN) tablet 1,000 mcg, 1,000 mcg, Oral, Daily

## 2021-08-20 PROCEDURE — 99284 EMERGENCY DEPT VISIT MOD MDM: CPT

## 2021-08-21 ENCOUNTER — HOSPITAL ENCOUNTER (EMERGENCY)
Age: 31
Discharge: HOME | End: 2021-08-21
Attending: EMERGENCY MEDICINE
Payer: MEDICAID

## 2021-08-21 ENCOUNTER — APPOINTMENT (OUTPATIENT)
Dept: CT IMAGING | Age: 31
End: 2021-08-21
Payer: MEDICAID

## 2021-08-21 VITALS
HEART RATE: 70 BPM | BODY MASS INDEX: 23 KG/M2 | TEMPERATURE: 98.2 F | OXYGEN SATURATION: 98 % | HEIGHT: 62 IN | SYSTOLIC BLOOD PRESSURE: 99 MMHG | DIASTOLIC BLOOD PRESSURE: 53 MMHG | RESPIRATION RATE: 16 BRPM | WEIGHT: 125 LBS

## 2021-08-21 DIAGNOSIS — Z20.822 CLOSE EXPOSURE TO COVID-19 VIRUS: Primary | ICD-10-CM

## 2021-08-21 DIAGNOSIS — E86.0 DEHYDRATION: ICD-10-CM

## 2021-08-21 DIAGNOSIS — R05.9 COUGH: ICD-10-CM

## 2021-08-21 LAB
ALBUMIN SERPL-MCNC: 4.4 G/DL (ref 3.5–5.2)
ALP BLD-CCNC: 65 U/L (ref 35–104)
ALT SERPL-CCNC: 14 U/L (ref 0–32)
ANION GAP SERPL CALCULATED.3IONS-SCNC: 9 MMOL/L (ref 7–16)
AST SERPL-CCNC: 32 U/L (ref 0–31)
BASOPHILS ABSOLUTE: 0 E9/L (ref 0–0.2)
BASOPHILS RELATIVE PERCENT: 0 % (ref 0–2)
BILIRUB SERPL-MCNC: 0.3 MG/DL (ref 0–1.2)
BUN BLDV-MCNC: 16 MG/DL (ref 6–20)
CALCIUM SERPL-MCNC: 9.3 MG/DL (ref 8.6–10.2)
CHLORIDE BLD-SCNC: 97 MMOL/L (ref 98–107)
CO2: 25 MMOL/L (ref 22–29)
CREAT SERPL-MCNC: 1 MG/DL (ref 0.5–1)
EKG ATRIAL RATE: 71 BPM
EKG P AXIS: 55 DEGREES
EKG P-R INTERVAL: 148 MS
EKG Q-T INTERVAL: 396 MS
EKG QRS DURATION: 74 MS
EKG QTC CALCULATION (BAZETT): 430 MS
EKG R AXIS: 65 DEGREES
EKG T AXIS: 54 DEGREES
EKG VENTRICULAR RATE: 71 BPM
EOSINOPHILS ABSOLUTE: 0 E9/L (ref 0.05–0.5)
EOSINOPHILS RELATIVE PERCENT: 0 % (ref 0–6)
GFR AFRICAN AMERICAN: >60
GFR NON-AFRICAN AMERICAN: >60 ML/MIN/1.73
GLUCOSE BLD-MCNC: 107 MG/DL (ref 74–99)
HCG, URINE, POC: NEGATIVE
HCT VFR BLD CALC: 35.7 % (ref 34–48)
HEMOGLOBIN: 11.5 G/DL (ref 11.5–15.5)
IMMATURE GRANULOCYTES #: 0.01 E9/L
IMMATURE GRANULOCYTES %: 0.1 % (ref 0–5)
LACTIC ACID, SEPSIS: 1 MMOL/L (ref 0.5–1.9)
LYMPHOCYTES ABSOLUTE: 1.22 E9/L (ref 1.5–4)
LYMPHOCYTES RELATIVE PERCENT: 17.8 % (ref 20–42)
Lab: NORMAL
MCH RBC QN AUTO: 25.3 PG (ref 26–35)
MCHC RBC AUTO-ENTMCNC: 32.2 % (ref 32–34.5)
MCV RBC AUTO: 78.6 FL (ref 80–99.9)
MONOCYTES ABSOLUTE: 0.4 E9/L (ref 0.1–0.95)
MONOCYTES RELATIVE PERCENT: 5.8 % (ref 2–12)
NEGATIVE QC PASS/FAIL: NORMAL
NEUTROPHILS ABSOLUTE: 5.24 E9/L (ref 1.8–7.3)
NEUTROPHILS RELATIVE PERCENT: 76.3 % (ref 43–80)
PDW BLD-RTO: 15.2 FL (ref 11.5–15)
PLATELET # BLD: 246 E9/L (ref 130–450)
PMV BLD AUTO: 10.1 FL (ref 7–12)
POSITIVE QC PASS/FAIL: NORMAL
POTASSIUM SERPL-SCNC: 3.5 MMOL/L (ref 3.5–5)
RBC # BLD: 4.54 E12/L (ref 3.5–5.5)
SODIUM BLD-SCNC: 131 MMOL/L (ref 132–146)
TOTAL PROTEIN: 8.5 G/DL (ref 6.4–8.3)
WBC # BLD: 6.9 E9/L (ref 4.5–11.5)

## 2021-08-21 PROCEDURE — 96360 HYDRATION IV INFUSION INIT: CPT

## 2021-08-21 PROCEDURE — 85025 COMPLETE CBC W/AUTO DIFF WBC: CPT

## 2021-08-21 PROCEDURE — 93005 ELECTROCARDIOGRAM TRACING: CPT | Performed by: EMERGENCY MEDICINE

## 2021-08-21 PROCEDURE — 96361 HYDRATE IV INFUSION ADD-ON: CPT

## 2021-08-21 PROCEDURE — 6360000004 HC RX CONTRAST MEDICATION: Performed by: RADIOLOGY

## 2021-08-21 PROCEDURE — 83605 ASSAY OF LACTIC ACID: CPT

## 2021-08-21 PROCEDURE — U0005 INFEC AGEN DETEC AMPLI PROBE: HCPCS

## 2021-08-21 PROCEDURE — U0003 INFECTIOUS AGENT DETECTION BY NUCLEIC ACID (DNA OR RNA); SEVERE ACUTE RESPIRATORY SYNDROME CORONAVIRUS 2 (SARS-COV-2) (CORONAVIRUS DISEASE [COVID-19]), AMPLIFIED PROBE TECHNIQUE, MAKING USE OF HIGH THROUGHPUT TECHNOLOGIES AS DESCRIBED BY CMS-2020-01-R: HCPCS

## 2021-08-21 PROCEDURE — 71275 CT ANGIOGRAPHY CHEST: CPT

## 2021-08-21 PROCEDURE — 2580000003 HC RX 258: Performed by: EMERGENCY MEDICINE

## 2021-08-21 PROCEDURE — 80053 COMPREHEN METABOLIC PANEL: CPT

## 2021-08-21 RX ORDER — 0.9 % SODIUM CHLORIDE 0.9 %
1000 INTRAVENOUS SOLUTION INTRAVENOUS ONCE
Status: COMPLETED | OUTPATIENT
Start: 2021-08-21 | End: 2021-08-21

## 2021-08-21 RX ADMIN — SODIUM CHLORIDE 1000 ML: 9 INJECTION, SOLUTION INTRAVENOUS at 04:16

## 2021-08-21 RX ADMIN — SODIUM CHLORIDE 1000 ML: 9 INJECTION, SOLUTION INTRAVENOUS at 07:16

## 2021-08-21 RX ADMIN — IOPAMIDOL 65 ML: 755 INJECTION, SOLUTION INTRAVENOUS at 05:33

## 2021-08-21 NOTE — ED PROVIDER NOTES
HPI:  8/21/21,   Time: 3:48 AM EDT       Jesus Manuel Washington is a 33 y.o. female presenting to the ED for cough and shortness of breath , beginning today's ago. The complaint has been intermittent, mild in severity, and worsened by walking. Patient is a pleasant 33 of female states she has a history of a \"clotting disorder\". She was told that during pregnancy they put her on Lovenox to prevent any blood clots. She has her child's father came over to visit 4 days ago when he had runny nose and cough symptoms progressed and he was tested 2 days ago and yesterday the test came back positive for Covid. Patient states her own symptoms developed about 3 days ago runny nose cough generalized fatigue she feels some shortness of breath with exertion and walking. She felt lightheaded and dizzy today so she came in. No headache no fevers. No chest pain. No pleuritic pain. Productivity to the cough. No nausea vomiting or diarrhea no flank pain or urinary complaints. Review of Systems:   Pertinent positives and negatives are stated within HPI, all other systems reviewed and are negative.          --------------------------------------------- PAST HISTORY ---------------------------------------------  Past Medical History:  has a past medical history of Alpha thalassemia silent carrier, Anemia, Migraine, Obstetric left labial laceration, delivered, current hospitalization, and [facility] Past Surgical History:  has a past surgical history that includes Appendectomy; other surgical history (09/27/2016); and Dilation & curettage. Social History:  reports that she has never smoked. She has never used smokeless tobacco. She reports previous alcohol use. She reports that she does not use drugs. Family History: family history is not on file. The patients home medications have been reviewed. Allergies: Patient has no known allergies.         ---------------------------------------------------PHYSICAL EXAM--------------------------------------    Constitutional/General: Alert and oriented x3, well appearing, non toxic in NAD  Head: Normocephalic and atraumatic  Eyes: PERRL, EOMI, conjunctive normal, sclera non icteric  Mouth: Oropharynx clear, handling secretions, no trismus, no asymmetry of the posterior oropharynx or uvular edema  Neck: Supple, full ROM, non tender to palpation in the midline, no stridor, no crepitus, no meningeal signs  Respiratory: Lungs clear to auscultation bilaterally, no wheezes, rales, or rhonchi. Not in respiratory distress; no tachypnea or accessory muscle use or retractions. No increased work of breathing speaking in full sentences. Cardiovascular:  Regular rate. Regular rhythm. No murmurs, gallops, or rubs. 2+ distal pulses  Chest: No chest wall tenderness  GI:  Abdomen Soft, Non tender, Non distended. +BS. No organomegaly, no palpable masses,  No rebound, guarding, or rigidity. Musculoskeletal: Moves all extremities x 4. Warm and well perfused, no clubbing, cyanosis, or edema. Capillary refill <3 seconds  Integument: skin warm and dry. No rashes. Lymphatic: no lymphadenopathy noted  Neurologic: GCS 15, no focal deficits, symmetric strength 5/5 in the upper and lower extremities bilaterally  Psychiatric: Normal Affect    -------------------------------------------------- RESULTS -------------------------------------------------  I have personally reviewed all laboratory and imaging results for this patient. Results are listed below.      LABS:  Results for orders placed or performed during the hospital encounter of 08/21/21   CBC Auto Differential   Result Value Ref Range    WBC 6.9 4.5 - 11.5 E9/L    RBC 4.54 3.50 - 5.50 E12/L    Hemoglobin 11.5 11.5 - 15.5 g/dL    Hematocrit 35.7 34.0 - 48.0 %    MCV 78.6 (L) 80.0 - 99.9 fL    MCH 25.3 (L) 26.0 - 35.0 pg    MCHC 32.2 32.0 - 34.5 %    RDW 15.2 (H) 11.5 - 15.0 fL    Platelets 172 650 - 828 E9/L    MPV 10.1 7.0 - 12.0 fL Neutrophils % 76.3 43.0 - 80.0 %    Immature Granulocytes % 0.1 0.0 - 5.0 %    Lymphocytes % 17.8 (L) 20.0 - 42.0 %    Monocytes % 5.8 2.0 - 12.0 %    Eosinophils % 0.0 0.0 - 6.0 %    Basophils % 0.0 0.0 - 2.0 %    Neutrophils Absolute 5.24 1.80 - 7.30 E9/L    Immature Granulocytes # 0.01 E9/L    Lymphocytes Absolute 1.22 (L) 1.50 - 4.00 E9/L    Monocytes Absolute 0.40 0.10 - 0.95 E9/L    Eosinophils Absolute 0.00 (L) 0.05 - 0.50 E9/L    Basophils Absolute 0.00 0.00 - 0.20 E9/L   Comprehensive Metabolic Panel   Result Value Ref Range    Sodium 131 (L) 132 - 146 mmol/L    Potassium 3.5 3.5 - 5.0 mmol/L    Chloride 97 (L) 98 - 107 mmol/L    CO2 25 22 - 29 mmol/L    Anion Gap 9 7 - 16 mmol/L    Glucose 107 (H) 74 - 99 mg/dL    BUN 16 6 - 20 mg/dL    CREATININE 1.0 0.5 - 1.0 mg/dL    GFR Non-African American >60 >=60 mL/min/1.73    GFR African American >60     Calcium 9.3 8.6 - 10.2 mg/dL    Total Protein 8.5 (H) 6.4 - 8.3 g/dL    Albumin 4.4 3.5 - 5.2 g/dL    Total Bilirubin 0.3 0.0 - 1.2 mg/dL    Alkaline Phosphatase 65 35 - 104 U/L    ALT 14 0 - 32 U/L    AST 32 (H) 0 - 31 U/L   Lactate, Sepsis   Result Value Ref Range    Lactic Acid, Sepsis 1.0 0.5 - 1.9 mmol/L   POC Pregnancy Urine Qual   Result Value Ref Range    HCG, Urine, POC Negative Negative    Lot Number [id number]     Positive QC Pass/Fail Pass     Negative QC Pass/Fail Pass    EKG 12 Lead   Result Value Ref Range    Ventricular Rate 71 BPM    Atrial Rate 71 BPM    P-R Interval 148 ms    QRS Duration 74 ms    Q-T Interval 396 ms    QTc Calculation (Bazett) 430 ms    P Axis 55 degrees    R Axis 65 degrees    T Axis 54 degrees       RADIOLOGY:  Interpreted by Radiologist.  CTA PULMONARY W CONTRAST   Final Result   No evidence of pulmonary embolism or acute pulmonary abnormality. EKG:  KG is normal sinus rhythm at 71 beats a minute no signs of any ST changes. QTc 430 no previous EKG for comparison.   EKG interpreted by myself.      ------------------------- NURSING NOTES AND VITALS REVIEWED ---------------------------   The nursing notes within the ED encounter and vital signs as below have been reviewed by myself. BP (!) 99/53   Pulse 92   Temp 98.2 °F (36.8 °C) (Oral)   Resp 16   Ht 5' 2\" (1.575 m)   Wt 125 lb (56.7 kg)   SpO2 98%   BMI 22.86 kg/m²   Oxygen Saturation Interpretation: Normal    The patients available past medical records and past encounters were reviewed. ------------------------------ ED COURSE/MEDICAL DECISION MAKING----------------------  Medications   0.9 % sodium chloride bolus (1,000 mLs Intravenous New Bag 8/21/21 0716)   0.9 % sodium chloride bolus (0 mLs Intravenous Stopped 8/21/21 0627)   iopamidol (ISOVUE-370) 76 % injection 65 mL (65 mLs Intravenous Given 8/21/21 0533)         ED COURSE:  ED Course as of Aug 21 0739   Sat Aug 21, 2021   0727 Had no desaturation with ambulation maintain saturation 96% on room air. [KK]      ED Course User Index  [KK] Kaity Mayen MD       Medical Decision Making:    Patient is a pleasant 73-year-old female with recent exposure to Covid. She states she has had upper respiratory symptoms and cough for the last 3 days some increasing shortness of breath she felt lightheaded and some dizziness today upon standing. She was slightly hypotensive in triage but she is quite thin we will give IV fluids check orthostatics she does have a history of a \"blood clotting disorder\" she is no longer on any blood thinners so she will CTA to rule out PE. Patient not hypoxic.    differential diagnosis Covid infection pneumonia ACS dysrhythmia PE dehydration orthostatic hypotension. This patient has remained hemodynamically stable during their ED course. EKG normal sinus rhythm at 71 beats a minute no signs of any ST changes. It was normal and a chemistry that was normal as well. Lactic acid was normal Pregnancy test was negative.   CTA of the chest showed no evidence of pulmonary embolism. Patient was ambulated and did not desaturate. Saturation remained at 95%. Given IV fluids before just discharged. She was feeling markedly better eager to go home. She was stable for discharge home. I did give her Covid precautions give her a note to stay off work until her Covid test came back. I did tell her with close exposure that and upper respiratory symptoms she should assume that she is likely Covid positive until the Covid test comes back. She will self isolate at home. Patient stable for discharge and was told to return to the ER for any new or worsening symptoms. Re-Evaluations:             Re-evaluation. Patients symptoms are improving    Re-examination  8/21/21   3:48 AM EDT          Vital Signs:   Vitals:    08/20/21 2221 08/20/21 2228 08/21/21 0415 08/21/21 0615   BP:  91/62 101/66 (!) 99/53   Pulse: 107  94 92   Resp: 16  16 16   Temp:  97.8 °F (36.6 °C) 98.2 °F (36.8 °C)    TempSrc:  Oral Oral    SpO2: 97%  97% 98%   Weight:  125 lb (56.7 kg)     Height:  5' 2\" (1.575 m)             Counseling: The emergency provider has spoken with the patient and discussed todays results, in addition to providing specific details for the plan of care and counseling regarding the diagnosis and prognosis. Questions are answered at this time and they are agreeable with the plan.       --------------------------------- IMPRESSION AND DISPOSITION ---------------------------------    IMPRESSION  1. Close exposure to COVID-19 virus    2. Cough    3. Dehydration        DISPOSITION  Disposition: Discharge to home  Patient condition is stable    NOTE: This report was transcribed using voice recognition software.  Every effort was made to ensure accuracy; however, inadvertent computerized transcription errors may be present        Marvin Mackey MD  08/24/21 0200

## 2021-08-21 NOTE — ED NOTES
Pt walked about 150 feet with pulse ox. HR stayed at 112 and SAO2 went from 97 to 95% with no SOB or dizziness.       Pavan Huizar RN  08/21/21 1381

## 2021-08-22 LAB — SARS-COV-2, PCR: DETECTED

## 2021-08-23 ENCOUNTER — CARE COORDINATION (OUTPATIENT)
Dept: CARE COORDINATION | Age: 31
End: 2021-08-23

## 2021-08-23 NOTE — CARE COORDINATION
Patient contacted regarding COVID-19 diagnosis. Discussed COVID-19 related testing which was available at this time. Test results were positive. Patient informed of results, if available? Yes. Ambulatory Care Manager contacted the patient by telephone to perform post discharge assessment. Call within 2 business days of discharge: Yes. Verified name and  with patient as identifiers. Provided introduction to self, and explanation of the CTN/ACM role, and reason for call due to risk factors for infection and/or exposure to COVID-19. Symptoms reviewed with patient who verbalized the following symptoms: cough, shortness of breath, loss of taste or smell, no new symptoms and no worsening symptoms. Due to no new or worsening symptoms encounter was not routed to provider for escalation. Discussed follow-up appointments. If no appointment was previously scheduled, appointment scheduling offered: Yes. [facility] follow up appointment(s): No future appointments. [facility] appointment(s): n/a    Non-face-to-face services provided:  Obtained and reviewed discharge summary and/or continuity of care documents     Advance Care Planning:   Does patient have an Advance Directive:  reviewed and current. Educated patient about risk for severe COVID-19 due to risk factors according to CDC guidelines. ACM reviewed discharge instructions, medical action plan and red flag symptoms with the patient who verbalized understanding. Discussed COVID vaccination status: Yes. Education provided on COVID-19 vaccination as appropriate. Discussed exposure protocols and quarantine with CDC Guidelines. Patient was given an opportunity to verbalize any questions and concerns and agrees to contact ACM or health care provider for questions related to their healthcare. Reviewed and educated patient on any new and changed medications related to discharge diagnosis     Was patient discharged with a pulse oximeter?  No Discussed and confirmed pulse oximeter discharge instructions and when to notify provider or seek emergency care. ACM provided contact information. No further follow-up call identified based on severity of symptoms and risk factors.   Pt states she is feeling better  Discussed CDC guidelines  States she is waiting for PCP to return call  Encouraged to call ACM with any questions or concerns

## 2023-06-01 ENCOUNTER — HOSPITAL ENCOUNTER (OUTPATIENT)
Age: 33
Discharge: HOME | End: 2023-06-03

## 2023-08-30 ENCOUNTER — OFFICE VISIT (OUTPATIENT)
Dept: ORTHOPEDIC SURGERY | Age: 33
End: 2023-08-30
Payer: MEDICAID

## 2023-08-30 VITALS — HEIGHT: 62 IN | WEIGHT: 125 LBS | BODY MASS INDEX: 23 KG/M2

## 2023-08-30 DIAGNOSIS — M25.531 RIGHT WRIST PAIN: Primary | ICD-10-CM

## 2023-08-30 DIAGNOSIS — G56.01 RIGHT CARPAL TUNNEL SYNDROME: ICD-10-CM

## 2023-08-30 PROCEDURE — 1036F TOBACCO NON-USER: CPT | Performed by: STUDENT IN AN ORGANIZED HEALTH CARE EDUCATION/TRAINING PROGRAM

## 2023-08-30 PROCEDURE — G8427 DOCREV CUR MEDS BY ELIG CLIN: HCPCS | Performed by: STUDENT IN AN ORGANIZED HEALTH CARE EDUCATION/TRAINING PROGRAM

## 2023-08-30 PROCEDURE — 99203 OFFICE O/P NEW LOW 30 MIN: CPT | Performed by: STUDENT IN AN ORGANIZED HEALTH CARE EDUCATION/TRAINING PROGRAM

## 2023-08-30 PROCEDURE — G8420 CALC BMI NORM PARAMETERS: HCPCS | Performed by: STUDENT IN AN ORGANIZED HEALTH CARE EDUCATION/TRAINING PROGRAM

## 2023-09-26 ENCOUNTER — HOSPITAL ENCOUNTER (OUTPATIENT)
Dept: NEUROLOGY | Age: 33
Discharge: HOME | End: 2023-09-26
Payer: MEDICAID

## 2023-09-26 VITALS — WEIGHT: 125 LBS | BODY MASS INDEX: 23 KG/M2 | HEIGHT: 62 IN

## 2023-09-26 DIAGNOSIS — G56.01 RIGHT CARPAL TUNNEL SYNDROME: ICD-10-CM

## 2023-09-26 PROCEDURE — 95886 MUSC TEST DONE W/N TEST COMP: CPT

## 2023-09-26 PROCEDURE — 95912 NRV CNDJ TEST 11-12 STUDIES: CPT | Performed by: PHYSICAL MEDICINE & REHABILITATION

## 2023-09-26 PROCEDURE — 95886 MUSC TEST DONE W/N TEST COMP: CPT | Performed by: PHYSICAL MEDICINE & REHABILITATION

## 2023-09-26 PROCEDURE — 95911 NRV CNDJ TEST 9-10 STUDIES: CPT

## 2025-02-08 ENCOUNTER — APPOINTMENT (OUTPATIENT)
Dept: ULTRASOUND IMAGING | Age: 35
End: 2025-02-08
Payer: MEDICAID

## 2025-02-08 ENCOUNTER — ANESTHESIA (OUTPATIENT)
Dept: OPERATING ROOM | Age: 35
End: 2025-02-08
Payer: MEDICAID

## 2025-02-08 ENCOUNTER — ANESTHESIA EVENT (OUTPATIENT)
Dept: OPERATING ROOM | Age: 35
End: 2025-02-08
Payer: MEDICAID

## 2025-02-08 ENCOUNTER — HOSPITAL ENCOUNTER (OUTPATIENT)
Age: 35
Setting detail: OBSERVATION
Discharge: HOME | End: 2025-02-09
Attending: EMERGENCY MEDICINE | Admitting: OBSTETRICS & GYNECOLOGY
Payer: MEDICAID

## 2025-02-08 DIAGNOSIS — O00.90 RUPTURED ECTOPIC PREGNANCY: Primary | ICD-10-CM

## 2025-02-08 DIAGNOSIS — D62 ABLA (ACUTE BLOOD LOSS ANEMIA): ICD-10-CM

## 2025-02-08 DIAGNOSIS — R55 SYNCOPE AND COLLAPSE: ICD-10-CM

## 2025-02-08 DIAGNOSIS — Z87.59 S/P ECTOPIC PREGNANCY: ICD-10-CM

## 2025-02-08 DIAGNOSIS — R10.0 ACUTE ABDOMEN: ICD-10-CM

## 2025-02-08 LAB
ALBUMIN SERPL-MCNC: 3.8 G/DL (ref 3.5–5.2)
ALP SERPL-CCNC: 72 U/L (ref 35–104)
ALT SERPL-CCNC: 8 U/L (ref 0–32)
ANION GAP SERPL CALCULATED.3IONS-SCNC: 11 MMOL/L (ref 7–16)
AST SERPL-CCNC: 21 U/L (ref 0–31)
B-HCG SERPL EIA 3RD IS-ACNC: ABNORMAL MIU/ML (ref 0–7)
BASOPHILS # BLD: 0.02 K/UL (ref 0–0.2)
BASOPHILS NFR BLD: 0 % (ref 0–2)
BILIRUB SERPL-MCNC: <0.2 MG/DL (ref 0–1.2)
BUN SERPL-MCNC: 10 MG/DL (ref 6–20)
CALCIUM SERPL-MCNC: 8.8 MG/DL (ref 8.6–10.2)
CHLORIDE SERPL-SCNC: 101 MMOL/L (ref 98–107)
CHP ED QC CHECK: NORMAL
CO2 SERPL-SCNC: 22 MMOL/L (ref 22–29)
CREAT SERPL-MCNC: 0.6 MG/DL (ref 0.5–1)
D-DIMER QUANTITATIVE: 275 NG/ML DDU (ref 0–230)
EOSINOPHIL # BLD: 0.02 K/UL (ref 0.05–0.5)
EOSINOPHILS RELATIVE PERCENT: 0 % (ref 0–6)
ERYTHROCYTE [DISTWIDTH] IN BLOOD BY AUTOMATED COUNT: 15.9 % (ref 11.5–15)
GFR, ESTIMATED: >90 ML/MIN/1.73M2
GLUCOSE BLD-MCNC: 128 MG/DL (ref 74–99)
GLUCOSE SERPL-MCNC: 116 MG/DL (ref 74–99)
HCG SERPL QL: POSITIVE
HCT VFR BLD AUTO: 30.8 % (ref 34–48)
HCT VFR BLD AUTO: 31.8 % (ref 34–48)
HGB BLD-MCNC: 10.1 G/DL (ref 11.5–15.5)
HGB BLD-MCNC: 9.1 G/DL (ref 11.5–15.5)
IMM GRANULOCYTES # BLD AUTO: <0.03 K/UL (ref 0–0.58)
IMM GRANULOCYTES NFR BLD: 0 % (ref 0–5)
INFLUENZA A BY PCR: NOT DETECTED
INFLUENZA B BY PCR: NOT DETECTED
INR PPP: 1.2
LACTATE BLDV-SCNC: 2.2 MMOL/L (ref 0.5–2.2)
LIPASE SERPL-CCNC: 15 U/L (ref 13–60)
LYMPHOCYTES NFR BLD: 2.44 K/UL (ref 1.5–4)
LYMPHOCYTES RELATIVE PERCENT: 23 % (ref 20–42)
MAGNESIUM SERPL-MCNC: 2.1 MG/DL (ref 1.6–2.6)
MCH RBC QN AUTO: 23.3 PG (ref 26–35)
MCHC RBC AUTO-ENTMCNC: 29.5 G/DL (ref 32–34.5)
MCV RBC AUTO: 79 FL (ref 80–99.9)
MONOCYTES NFR BLD: 0.62 K/UL (ref 0.1–0.95)
MONOCYTES NFR BLD: 6 % (ref 2–12)
NEUTROPHILS NFR BLD: 70 % (ref 43–80)
NEUTS SEG NFR BLD: 7.38 K/UL (ref 1.8–7.3)
PLATELET # BLD AUTO: 356 K/UL (ref 130–450)
PMV BLD AUTO: 9.7 FL (ref 7–12)
POTASSIUM SERPL-SCNC: 3.8 MMOL/L (ref 3.5–5)
PROT SERPL-MCNC: 7.8 G/DL (ref 6.4–8.3)
PROTHROMBIN TIME: 13.3 SEC (ref 9.3–12.4)
RBC # BLD AUTO: 3.9 M/UL (ref 3.5–5.5)
SARS-COV-2 RDRP RESP QL NAA+PROBE: NOT DETECTED
SODIUM SERPL-SCNC: 134 MMOL/L (ref 132–146)
SPECIMEN DESCRIPTION: NORMAL
TSH SERPL DL<=0.05 MIU/L-ACNC: 1.16 UIU/ML (ref 0.27–4.2)
WBC OTHER # BLD: 10.5 K/UL (ref 4.5–11.5)

## 2025-02-08 PROCEDURE — G0378 HOSPITAL OBSERVATION PER HR: HCPCS

## 2025-02-08 PROCEDURE — 36430 TRANSFUSION BLD/BLD COMPNT: CPT

## 2025-02-08 PROCEDURE — 2500000003 HC RX 250 WO HCPCS: Performed by: EMERGENCY MEDICINE

## 2025-02-08 PROCEDURE — 86850 RBC ANTIBODY SCREEN: CPT

## 2025-02-08 PROCEDURE — 7100000000 HC PACU RECOVERY - FIRST 15 MIN: Performed by: OBSTETRICS & GYNECOLOGY

## 2025-02-08 PROCEDURE — 86901 BLOOD TYPING SEROLOGIC RH(D): CPT

## 2025-02-08 PROCEDURE — 99285 EMERGENCY DEPT VISIT HI MDM: CPT

## 2025-02-08 PROCEDURE — 2580000003 HC RX 258: Performed by: EMERGENCY MEDICINE

## 2025-02-08 PROCEDURE — 83690 ASSAY OF LIPASE: CPT

## 2025-02-08 PROCEDURE — 3600000013 HC SURGERY LEVEL 3 ADDTL 15MIN: Performed by: OBSTETRICS & GYNECOLOGY

## 2025-02-08 PROCEDURE — 6360000002 HC RX W HCPCS: Performed by: ANESTHESIOLOGY

## 2025-02-08 PROCEDURE — 3700000000 HC ANESTHESIA ATTENDED CARE: Performed by: OBSTETRICS & GYNECOLOGY

## 2025-02-08 PROCEDURE — 85014 HEMATOCRIT: CPT

## 2025-02-08 PROCEDURE — 83605 ASSAY OF LACTIC ACID: CPT

## 2025-02-08 PROCEDURE — 84702 CHORIONIC GONADOTROPIN TEST: CPT

## 2025-02-08 PROCEDURE — 85379 FIBRIN DEGRADATION QUANT: CPT

## 2025-02-08 PROCEDURE — 6360000002 HC RX W HCPCS: Performed by: NURSE ANESTHETIST, CERTIFIED REGISTERED

## 2025-02-08 PROCEDURE — 85018 HEMOGLOBIN: CPT

## 2025-02-08 PROCEDURE — 87502 INFLUENZA DNA AMP PROBE: CPT

## 2025-02-08 PROCEDURE — 3600000003 HC SURGERY LEVEL 3 BASE: Performed by: OBSTETRICS & GYNECOLOGY

## 2025-02-08 PROCEDURE — 96374 THER/PROPH/DIAG INJ IV PUSH: CPT

## 2025-02-08 PROCEDURE — 84443 ASSAY THYROID STIM HORMONE: CPT

## 2025-02-08 PROCEDURE — 6360000002 HC RX W HCPCS: Performed by: OBSTETRICS & GYNECOLOGY

## 2025-02-08 PROCEDURE — 84703 CHORIONIC GONADOTROPIN ASSAY: CPT

## 2025-02-08 PROCEDURE — 7100000001 HC PACU RECOVERY - ADDTL 15 MIN: Performed by: OBSTETRICS & GYNECOLOGY

## 2025-02-08 PROCEDURE — 86900 BLOOD TYPING SEROLOGIC ABO: CPT

## 2025-02-08 PROCEDURE — 2500000003 HC RX 250 WO HCPCS: Performed by: OBSTETRICS & GYNECOLOGY

## 2025-02-08 PROCEDURE — 83735 ASSAY OF MAGNESIUM: CPT

## 2025-02-08 PROCEDURE — 85025 COMPLETE CBC W/AUTO DIFF WBC: CPT

## 2025-02-08 PROCEDURE — 82962 GLUCOSE BLOOD TEST: CPT

## 2025-02-08 PROCEDURE — 86923 COMPATIBILITY TEST ELECTRIC: CPT

## 2025-02-08 PROCEDURE — 88305 TISSUE EXAM BY PATHOLOGIST: CPT

## 2025-02-08 PROCEDURE — 93975 VASCULAR STUDY: CPT

## 2025-02-08 PROCEDURE — 76817 TRANSVAGINAL US OBSTETRIC: CPT

## 2025-02-08 PROCEDURE — 96361 HYDRATE IV INFUSION ADD-ON: CPT

## 2025-02-08 PROCEDURE — 87635 SARS-COV-2 COVID-19 AMP PRB: CPT

## 2025-02-08 PROCEDURE — 2580000003 HC RX 258: Performed by: OBSTETRICS & GYNECOLOGY

## 2025-02-08 PROCEDURE — 93005 ELECTROCARDIOGRAM TRACING: CPT | Performed by: EMERGENCY MEDICINE

## 2025-02-08 PROCEDURE — 96375 TX/PRO/DX INJ NEW DRUG ADDON: CPT

## 2025-02-08 PROCEDURE — 2500000003 HC RX 250 WO HCPCS: Performed by: NURSE ANESTHETIST, CERTIFIED REGISTERED

## 2025-02-08 PROCEDURE — P9016 RBC LEUKOCYTES REDUCED: HCPCS

## 2025-02-08 PROCEDURE — 80053 COMPREHEN METABOLIC PANEL: CPT

## 2025-02-08 PROCEDURE — 2709999900 HC NON-CHARGEABLE SUPPLY: Performed by: OBSTETRICS & GYNECOLOGY

## 2025-02-08 PROCEDURE — 3700000001 HC ADD 15 MINUTES (ANESTHESIA): Performed by: OBSTETRICS & GYNECOLOGY

## 2025-02-08 PROCEDURE — 85610 PROTHROMBIN TIME: CPT

## 2025-02-08 PROCEDURE — 6360000002 HC RX W HCPCS: Performed by: EMERGENCY MEDICINE

## 2025-02-08 RX ORDER — SODIUM CHLORIDE 0.9 % (FLUSH) 0.9 %
5-40 SYRINGE (ML) INJECTION PRN
Status: DISCONTINUED | OUTPATIENT
Start: 2025-02-08 | End: 2025-02-09 | Stop reason: HOSPADM

## 2025-02-08 RX ORDER — ETOMIDATE 2 MG/ML
INJECTION INTRAVENOUS
Status: DISCONTINUED | OUTPATIENT
Start: 2025-02-08 | End: 2025-02-08 | Stop reason: SDUPTHER

## 2025-02-08 RX ORDER — MEPERIDINE HYDROCHLORIDE 25 MG/ML
12.5 INJECTION INTRAMUSCULAR; INTRAVENOUS; SUBCUTANEOUS ONCE
Status: COMPLETED | OUTPATIENT
Start: 2025-02-08 | End: 2025-02-08

## 2025-02-08 RX ORDER — ACETAMINOPHEN 500 MG
1000 TABLET ORAL EVERY 8 HOURS PRN
Status: DISCONTINUED | OUTPATIENT
Start: 2025-02-08 | End: 2025-02-09 | Stop reason: HOSPADM

## 2025-02-08 RX ORDER — SODIUM CHLORIDE 9 MG/ML
INJECTION, SOLUTION INTRAVENOUS PRN
Status: DISCONTINUED | OUTPATIENT
Start: 2025-02-08 | End: 2025-02-09 | Stop reason: HOSPADM

## 2025-02-08 RX ORDER — DEXAMETHASONE SODIUM PHOSPHATE 4 MG/ML
INJECTION, SOLUTION INTRA-ARTICULAR; INTRALESIONAL; INTRAMUSCULAR; INTRAVENOUS; SOFT TISSUE
Status: DISCONTINUED | OUTPATIENT
Start: 2025-02-08 | End: 2025-02-08 | Stop reason: SDUPTHER

## 2025-02-08 RX ORDER — FENTANYL CITRATE 50 UG/ML
50 INJECTION, SOLUTION INTRAMUSCULAR; INTRAVENOUS EVERY 5 MIN PRN
Status: DISCONTINUED | OUTPATIENT
Start: 2025-02-08 | End: 2025-02-08 | Stop reason: HOSPADM

## 2025-02-08 RX ORDER — SODIUM CHLORIDE 0.9 % (FLUSH) 0.9 %
5-40 SYRINGE (ML) INJECTION EVERY 12 HOURS SCHEDULED
Status: DISCONTINUED | OUTPATIENT
Start: 2025-02-08 | End: 2025-02-08 | Stop reason: HOSPADM

## 2025-02-08 RX ORDER — ONDANSETRON 2 MG/ML
4 INJECTION INTRAMUSCULAR; INTRAVENOUS
Status: DISCONTINUED | OUTPATIENT
Start: 2025-02-08 | End: 2025-02-08 | Stop reason: HOSPADM

## 2025-02-08 RX ORDER — NALOXONE HYDROCHLORIDE 0.4 MG/ML
INJECTION, SOLUTION INTRAMUSCULAR; INTRAVENOUS; SUBCUTANEOUS PRN
Status: DISCONTINUED | OUTPATIENT
Start: 2025-02-08 | End: 2025-02-08 | Stop reason: HOSPADM

## 2025-02-08 RX ORDER — ONDANSETRON 2 MG/ML
4 INJECTION INTRAMUSCULAR; INTRAVENOUS EVERY 6 HOURS PRN
Status: DISCONTINUED | OUTPATIENT
Start: 2025-02-08 | End: 2025-02-09 | Stop reason: HOSPADM

## 2025-02-08 RX ORDER — SODIUM CHLORIDE, SODIUM LACTATE, POTASSIUM CHLORIDE, CALCIUM CHLORIDE 600; 310; 30; 20 MG/100ML; MG/100ML; MG/100ML; MG/100ML
INJECTION, SOLUTION INTRAVENOUS CONTINUOUS
Status: DISCONTINUED | OUTPATIENT
Start: 2025-02-08 | End: 2025-02-09 | Stop reason: HOSPADM

## 2025-02-08 RX ORDER — MORPHINE SULFATE 2 MG/ML
2 INJECTION, SOLUTION INTRAMUSCULAR; INTRAVENOUS
Status: DISCONTINUED | OUTPATIENT
Start: 2025-02-08 | End: 2025-02-09 | Stop reason: HOSPADM

## 2025-02-08 RX ORDER — MIDAZOLAM HYDROCHLORIDE 1 MG/ML
INJECTION, SOLUTION INTRAMUSCULAR; INTRAVENOUS
Status: DISCONTINUED | OUTPATIENT
Start: 2025-02-08 | End: 2025-02-08 | Stop reason: SDUPTHER

## 2025-02-08 RX ORDER — ONDANSETRON 2 MG/ML
4 INJECTION INTRAMUSCULAR; INTRAVENOUS ONCE
Status: COMPLETED | OUTPATIENT
Start: 2025-02-08 | End: 2025-02-08

## 2025-02-08 RX ORDER — SODIUM CHLORIDE 9 MG/ML
INJECTION, SOLUTION INTRAVENOUS ONCE
Status: COMPLETED | OUTPATIENT
Start: 2025-02-08 | End: 2025-02-08

## 2025-02-08 RX ORDER — SODIUM CHLORIDE 0.9 % (FLUSH) 0.9 %
5-40 SYRINGE (ML) INJECTION EVERY 12 HOURS SCHEDULED
Status: DISCONTINUED | OUTPATIENT
Start: 2025-02-08 | End: 2025-02-09 | Stop reason: HOSPADM

## 2025-02-08 RX ORDER — ONDANSETRON 4 MG/1
4 TABLET, ORALLY DISINTEGRATING ORAL EVERY 8 HOURS PRN
Status: DISCONTINUED | OUTPATIENT
Start: 2025-02-08 | End: 2025-02-09 | Stop reason: HOSPADM

## 2025-02-08 RX ORDER — FENTANYL CITRATE 50 UG/ML
INJECTION, SOLUTION INTRAMUSCULAR; INTRAVENOUS
Status: DISCONTINUED | OUTPATIENT
Start: 2025-02-08 | End: 2025-02-08 | Stop reason: SDUPTHER

## 2025-02-08 RX ORDER — PHENYLEPHRINE HCL IN 0.9% NACL 1 MG/10 ML
SYRINGE (ML) INTRAVENOUS
Status: DISCONTINUED | OUTPATIENT
Start: 2025-02-08 | End: 2025-02-08 | Stop reason: SDUPTHER

## 2025-02-08 RX ORDER — FENTANYL CITRATE 50 UG/ML
25 INJECTION, SOLUTION INTRAMUSCULAR; INTRAVENOUS ONCE
Status: COMPLETED | OUTPATIENT
Start: 2025-02-08 | End: 2025-02-08

## 2025-02-08 RX ORDER — SODIUM CHLORIDE 9 MG/ML
INJECTION, SOLUTION INTRAVENOUS PRN
Status: DISCONTINUED | OUTPATIENT
Start: 2025-02-08 | End: 2025-02-08 | Stop reason: HOSPADM

## 2025-02-08 RX ORDER — OXYCODONE HYDROCHLORIDE 5 MG/1
5 TABLET ORAL EVERY 4 HOURS PRN
Status: DISCONTINUED | OUTPATIENT
Start: 2025-02-08 | End: 2025-02-09 | Stop reason: HOSPADM

## 2025-02-08 RX ORDER — ROCURONIUM BROMIDE 10 MG/ML
INJECTION, SOLUTION INTRAVENOUS
Status: DISCONTINUED | OUTPATIENT
Start: 2025-02-08 | End: 2025-02-08 | Stop reason: SDUPTHER

## 2025-02-08 RX ORDER — ONDANSETRON 2 MG/ML
INJECTION INTRAMUSCULAR; INTRAVENOUS
Status: DISCONTINUED | OUTPATIENT
Start: 2025-02-08 | End: 2025-02-08 | Stop reason: SDUPTHER

## 2025-02-08 RX ORDER — SODIUM CHLORIDE 9 MG/ML
INJECTION, SOLUTION INTRAVENOUS PRN
Status: COMPLETED | OUTPATIENT
Start: 2025-02-08 | End: 2025-02-09

## 2025-02-08 RX ORDER — SUCCINYLCHOLINE/SOD CL,ISO/PF 200MG/10ML
SYRINGE (ML) INTRAVENOUS
Status: DISCONTINUED | OUTPATIENT
Start: 2025-02-08 | End: 2025-02-08 | Stop reason: SDUPTHER

## 2025-02-08 RX ORDER — LIDOCAINE HYDROCHLORIDE 20 MG/ML
INJECTION, SOLUTION INFILTRATION; PERINEURAL
Status: DISCONTINUED | OUTPATIENT
Start: 2025-02-08 | End: 2025-02-08 | Stop reason: SDUPTHER

## 2025-02-08 RX ORDER — SODIUM CHLORIDE 0.9 % (FLUSH) 0.9 %
5-40 SYRINGE (ML) INJECTION PRN
Status: DISCONTINUED | OUTPATIENT
Start: 2025-02-08 | End: 2025-02-08 | Stop reason: HOSPADM

## 2025-02-08 RX ORDER — DIPHENHYDRAMINE HYDROCHLORIDE 50 MG/ML
INJECTION INTRAMUSCULAR; INTRAVENOUS
Status: DISCONTINUED | OUTPATIENT
Start: 2025-02-08 | End: 2025-02-08 | Stop reason: SDUPTHER

## 2025-02-08 RX ORDER — FENTANYL CITRATE 50 UG/ML
25 INJECTION, SOLUTION INTRAMUSCULAR; INTRAVENOUS EVERY 5 MIN PRN
Status: DISCONTINUED | OUTPATIENT
Start: 2025-02-08 | End: 2025-02-08 | Stop reason: HOSPADM

## 2025-02-08 RX ADMIN — Medication 160 MG: at 20:14

## 2025-02-08 RX ADMIN — SODIUM CHLORIDE, PRESERVATIVE FREE 10 ML: 5 INJECTION INTRAVENOUS at 23:50

## 2025-02-08 RX ADMIN — Medication 100 MCG: at 20:58

## 2025-02-08 RX ADMIN — Medication 100 MCG: at 20:29

## 2025-02-08 RX ADMIN — MORPHINE SULFATE 2 MG: 2 INJECTION, SOLUTION INTRAMUSCULAR; INTRAVENOUS at 23:24

## 2025-02-08 RX ADMIN — MEPERIDINE HYDROCHLORIDE 12.5 MG: 25 INJECTION INTRAMUSCULAR; INTRAVENOUS; SUBCUTANEOUS at 21:41

## 2025-02-08 RX ADMIN — FENTANYL CITRATE 50 MCG: 50 INJECTION, SOLUTION INTRAMUSCULAR; INTRAVENOUS at 20:31

## 2025-02-08 RX ADMIN — SUGAMMADEX 200 MG: 100 INJECTION, SOLUTION INTRAVENOUS at 21:14

## 2025-02-08 RX ADMIN — DIPHENHYDRAMINE HYDROCHLORIDE 25 MG: 50 INJECTION, SOLUTION INTRAMUSCULAR; INTRAVENOUS at 20:10

## 2025-02-08 RX ADMIN — LIDOCAINE HYDROCHLORIDE 100 MG: 20 INJECTION, SOLUTION INFILTRATION; PERINEURAL at 20:14

## 2025-02-08 RX ADMIN — ONDANSETRON 4 MG: 2 INJECTION, SOLUTION INTRAMUSCULAR; INTRAVENOUS at 19:09

## 2025-02-08 RX ADMIN — SODIUM CHLORIDE, POTASSIUM CHLORIDE, SODIUM LACTATE AND CALCIUM CHLORIDE: 600; 310; 30; 20 INJECTION, SOLUTION INTRAVENOUS at 23:29

## 2025-02-08 RX ADMIN — FENTANYL CITRATE 25 MCG: 50 INJECTION INTRAMUSCULAR; INTRAVENOUS at 19:09

## 2025-02-08 RX ADMIN — SODIUM CHLORIDE: 9 INJECTION, SOLUTION INTRAVENOUS at 19:50

## 2025-02-08 RX ADMIN — FENTANYL CITRATE 50 MCG: 50 INJECTION, SOLUTION INTRAMUSCULAR; INTRAVENOUS at 21:22

## 2025-02-08 RX ADMIN — MIDAZOLAM 1 MG: 1 INJECTION INTRAMUSCULAR; INTRAVENOUS at 21:21

## 2025-02-08 RX ADMIN — ROCURONIUM BROMIDE 30 MG: 10 INJECTION, SOLUTION INTRAVENOUS at 20:22

## 2025-02-08 RX ADMIN — ETOMIDATE 20 MG: 2 INJECTION, SOLUTION INTRAVENOUS at 20:14

## 2025-02-08 RX ADMIN — MIDAZOLAM 1 MG: 1 INJECTION INTRAMUSCULAR; INTRAVENOUS at 20:10

## 2025-02-08 RX ADMIN — SODIUM CHLORIDE: 9 INJECTION, SOLUTION INTRAVENOUS at 18:00

## 2025-02-08 RX ADMIN — FAMOTIDINE 20 MG: 10 INJECTION, SOLUTION INTRAVENOUS at 19:10

## 2025-02-08 RX ADMIN — ONDANSETRON 4 MG: 2 INJECTION INTRAMUSCULAR; INTRAVENOUS at 20:22

## 2025-02-08 RX ADMIN — DEXAMETHASONE SODIUM PHOSPHATE 8 MG: 4 INJECTION, SOLUTION INTRAMUSCULAR; INTRAVENOUS at 20:22

## 2025-02-08 ASSESSMENT — PAIN DESCRIPTION - ORIENTATION: ORIENTATION: LOWER;RIGHT

## 2025-02-08 ASSESSMENT — PAIN SCALES - GENERAL
PAINLEVEL_OUTOF10: 10
PAINLEVEL_OUTOF10: 9

## 2025-02-08 ASSESSMENT — LIFESTYLE VARIABLES
HOW MANY STANDARD DRINKS CONTAINING ALCOHOL DO YOU HAVE ON A TYPICAL DAY: 1 OR 2
HOW OFTEN DO YOU HAVE A DRINK CONTAINING ALCOHOL: MONTHLY OR LESS

## 2025-02-08 ASSESSMENT — PAIN DESCRIPTION - DESCRIPTORS: DESCRIPTORS: BURNING

## 2025-02-08 ASSESSMENT — PAIN - FUNCTIONAL ASSESSMENT
PAIN_FUNCTIONAL_ASSESSMENT: PREVENTS OR INTERFERES WITH MANY ACTIVE NOT PASSIVE ACTIVITIES
PAIN_FUNCTIONAL_ASSESSMENT: 0-10

## 2025-02-08 ASSESSMENT — PAIN DESCRIPTION - LOCATION: LOCATION: ABDOMEN

## 2025-02-08 NOTE — CONSENT
Informed Consent for Blood Component Transfusion Note    I have discussed with the patient the rationale for blood component transfusion; its benefits in treating or preventing fatigue, organ damage, or death; and its risk which includes mild transfusion reactions, rare risk of blood borne infection, or more serious but rare reactions. I have discussed the alternatives to transfusion, including the risk and consequences of not receiving transfusion. The patient had an opportunity to ask questions and had agreed to proceed with transfusion of blood components.    Electronically signed by Fred Owen DO on 2/8/25 at 6:52 PM EST

## 2025-02-08 NOTE — ED PROVIDER NOTES
normal.      Breath sounds: No wheezing or rhonchi.   Chest:      Chest wall: No tenderness.   Abdominal:      General: Bowel sounds are normal.      Tenderness: There is generalized abdominal tenderness and tenderness in the right lower quadrant, periumbilical area and suprapubic area. There is no right CVA tenderness, left CVA tenderness or guarding.      Hernia: No hernia is present.   Musculoskeletal:         General: No swelling or deformity.      Cervical back: Normal range of motion and neck supple. No muscular tenderness.   Skin:     General: Skin is warm and dry.      Capillary Refill: Capillary refill takes less than 2 seconds.   Neurological:      General: No focal deficit present.      Mental Status: She is alert and oriented to person, place, and time.   Psychiatric:         Mood and Affect: Mood normal.             DIAGNOSTIC RESULTS   LABS:    Labs Reviewed   CBC WITH AUTO DIFFERENTIAL - Abnormal; Notable for the following components:       Result Value    Hemoglobin 9.1 (*)     Hematocrit 30.8 (*)     MCV 79.0 (*)     MCH 23.3 (*)     MCHC 29.5 (*)     RDW 15.9 (*)     Neutrophils Absolute 7.38 (*)     Eosinophils Absolute 0.02 (*)     All other components within normal limits   COMPREHENSIVE METABOLIC PANEL - Abnormal; Notable for the following components:    Glucose 116 (*)     All other components within normal limits   HCG, SERUM, QUALITATIVE - Abnormal; Notable for the following components:    Preg, Serum POSITIVE (*)     All other components within normal limits   PROTIME-INR - Abnormal; Notable for the following components:    Protime 13.3 (*)     All other components within normal limits   D-DIMER, QUANTITATIVE - Abnormal; Notable for the following components:    D-Dimer, Quant 275 (*)     All other components within normal limits   HCG, QUANTITATIVE, PREGNANCY - Abnormal; Notable for the following components:    hCG Quant 18,056.0 (*)     All other components within normal limits   POCT

## 2025-02-09 VITALS
RESPIRATION RATE: 18 BRPM | DIASTOLIC BLOOD PRESSURE: 67 MMHG | WEIGHT: 128 LBS | TEMPERATURE: 98.6 F | SYSTOLIC BLOOD PRESSURE: 101 MMHG | BODY MASS INDEX: 23.55 KG/M2 | OXYGEN SATURATION: 99 % | HEIGHT: 62 IN | HEART RATE: 83 BPM

## 2025-02-09 LAB
ABO/RH: NORMAL
ANTIBODY SCREEN: NEGATIVE
ARM BAND NUMBER: NORMAL
BLOOD BANK BLOOD PRODUCT EXPIRATION DATE: NORMAL
BLOOD BANK BLOOD PRODUCT EXPIRATION DATE: NORMAL
BLOOD BANK DISPENSE STATUS: NORMAL
BLOOD BANK DISPENSE STATUS: NORMAL
BLOOD BANK ISBT PRODUCT BLOOD TYPE: 6200
BLOOD BANK ISBT PRODUCT BLOOD TYPE: 6200
BLOOD BANK PRODUCT CODE: NORMAL
BLOOD BANK PRODUCT CODE: NORMAL
BLOOD BANK SAMPLE EXPIRATION: NORMAL
BLOOD BANK UNIT TYPE AND RH: NORMAL
BLOOD BANK UNIT TYPE AND RH: NORMAL
BPU ID: NORMAL
BPU ID: NORMAL
COMPONENT: NORMAL
COMPONENT: NORMAL
CROSSMATCH RESULT: NORMAL
CROSSMATCH RESULT: NORMAL
HCT VFR BLD AUTO: 30.8 % (ref 34–48)
HGB BLD-MCNC: 9.9 G/DL (ref 11.5–15.5)
TRANSFUSION STATUS: NORMAL
TRANSFUSION STATUS: NORMAL
UNIT DIVISION: 0
UNIT DIVISION: 0
UNIT ISSUE DATE/TIME: NORMAL
UNIT ISSUE DATE/TIME: NORMAL

## 2025-02-09 PROCEDURE — 6370000000 HC RX 637 (ALT 250 FOR IP): Performed by: OBSTETRICS & GYNECOLOGY

## 2025-02-09 PROCEDURE — 96361 HYDRATE IV INFUSION ADD-ON: CPT

## 2025-02-09 PROCEDURE — 85014 HEMATOCRIT: CPT

## 2025-02-09 PROCEDURE — 85018 HEMOGLOBIN: CPT

## 2025-02-09 PROCEDURE — G0378 HOSPITAL OBSERVATION PER HR: HCPCS

## 2025-02-09 RX ORDER — OXYCODONE HYDROCHLORIDE 5 MG/1
5 TABLET ORAL EVERY 6 HOURS PRN
Qty: 12 TABLET | Refills: 0 | Status: SHIPPED | OUTPATIENT
Start: 2025-02-09 | End: 2025-02-14

## 2025-02-09 RX ORDER — IBUPROFEN 600 MG/1
600 TABLET, FILM COATED ORAL EVERY 8 HOURS PRN
Qty: 15 TABLET | Refills: 0 | Status: SHIPPED | OUTPATIENT
Start: 2025-02-09

## 2025-02-09 RX ADMIN — OXYCODONE 5 MG: 5 TABLET ORAL at 03:33

## 2025-02-09 RX ADMIN — OXYCODONE 5 MG: 5 TABLET ORAL at 12:11

## 2025-02-09 ASSESSMENT — PAIN SCALES - GENERAL
PAINLEVEL_OUTOF10: 8
PAINLEVEL_OUTOF10: 6

## 2025-02-09 ASSESSMENT — PAIN DESCRIPTION - ORIENTATION
ORIENTATION: RIGHT;LEFT;LOWER
ORIENTATION: MID

## 2025-02-09 ASSESSMENT — PAIN DESCRIPTION - LOCATION
LOCATION: ABDOMEN
LOCATION: ABDOMEN

## 2025-02-09 ASSESSMENT — PAIN DESCRIPTION - DESCRIPTORS: DESCRIPTORS: BURNING

## 2025-02-09 NOTE — H&P
[place] [address]                           HISTORY & PHYSICAL      PATIENT NAME: IGOR KENNY                 : 1990  MED REC NO: [id number]                        ROOM: [facility]  ACCOUNT NO: [id number]                       ADMIT DATE: 2025  PROVIDER: Benedict Mercado MD      TIME:  2003 hours.    HISTORY OF PRESENT ILLNESS:  I was covering Dr. Cody this weekend.  I got a page from the emergency room at 7:01 p.m.  Dr. Owen talked to me about the patient who is a 34-year-old,  7, para 3, who presented to the emergency room with a last menstrual period of  to .  The patient states she started bleeding on  and has not stopped having some vaginal bleeding.  The patient was complaining of severe abdominal pain.  Today, she had an episode of syncope in front of the ER doctor.  He immediately placed an IV, brought to the treatment area.  Labs, type and screen, and IV fluids were started.  The patient had a pregnancy test that came back positive and Dr. Owen said that he saw the ultrasound that showed no intrauterine pregnancy and complex free fluid.  He was obviously concerned for an ectopic pregnancy.  I arrived in the OR at 7:35 p.m.  The patient was transferred over from the ER and arrived at 7:55.  I spoke to the patient and I told her more than likely she has a ruptured ectopic pregnancy which will require removal of her fallopian tube, we were told we will do a laparoscopy, possible laparotomy.  She understands.  I reviewed the risk of the procedures with her and she agreed to proceed.    MEDICINES:  None.    PAST MEDICAL HISTORY:  Negative per the patient.    PAST SURGICAL HISTORY:  Appendectomy per the patient.    PHYSICAL EXAM:  From the ER, the patient had generalized abdominal tenderness.  Pelvic exam was not done by me.    LABS:  Hemoglobin was 9.1 at 1806

## 2025-02-09 NOTE — PROGRESS NOTES
Progress Note    SUBJECTIVE:  no complaints. Not passing flatus. On regular diet. Voided. Ambulating.    OBJECTIVE:    VITALS:  BP (!) 98/57   Pulse 80   Temp 98.4 °F (36.9 °C) (Oral)   Resp 20   Ht 1.575 m (5' 2\")   Wt 58.1 kg (128 lb)   SpO2 99%   BMI 23.41 kg/m²   Physical Exam  CONSTITUTIONAL:  awake, alert, cooperative, no apparent distress, and appears stated age  ABDOMEN:  normal bowel sounds, soft, non-distended, non-tender, and incisions okay  Ext no calf pain    DATA:  Hemoglobin/Hematocrit:    Lab Results   Component Value Date/Time    HGB 9.9 02/09/2025 03:42 AM    HCT 30.8 02/09/2025 03:42 AM       ASSESSMENT AND PLAN  POD # 1 laparoscopic right salpingectomy for ruptured ectopic pregnancy  Received 2 units prbc's  Hgb stable  Home today after passes flatus.Precautions given. Has an appt tomorrow with dr lang. Told to call office and see when they want her to come to office.      Benedict Mercado MD 2/9/2025 11:17 AM

## 2025-02-09 NOTE — PROGRESS NOTES
4 Eyes Skin Assessment     NAME:  Miriam Rosa  YOB: 1990  MEDICAL RECORD NUMBER:  [id number]    The patient is being assessed for  Admission    I agree that at least one RN has performed a thorough Head to Toe Skin Assessment on the patient. ALL assessment sites listed below have been assessed.      Areas assessed by both nurses:    Head, Face, Ears, Shoulders, Back, Chest, Arms, Elbows, Hands, Sacrum. Buttock, Coccyx, Ischium, and Legs. Feet and Heels        Does the Patient have a Wound? No noted wound(s)       Jonatan Prevention initiated by RN: No  Wound Care Orders initiated by RN: No    Pressure Injury (Stage 3,4, Unstageable, DTI, NWPT, and Complex wounds) if present, place Wound referral order by RN under : No    New Ostomies, if present place, Ostomy referral order under : No     Nurse 1 eSignature: Electronically signed by Tim Mcneil RN on 2/8/25 at 11:52 PM EST    **SHARE this note so that the co-signing nurse can place an eSignature**    Nurse 2 eSignature: Electronically signed by Inessa Gibson RN on 2/8/25 at 11:53 PM EST

## 2025-02-09 NOTE — PROCEDURES
Department of Obstetrics and Gynecology   Gyn Operative Note        Pre-operative Diagnosis:  ruptured ectopic pregnancy  Post-operative Diagnosis:  Same    Procedure: laparoscopic right salpingectomy  Surgeon:  Benedict Mercado MD       Anesthesia:  general  Findings:  see or report  Estimated blood loss: less than 50   Fluids:   800  ml iv fluids, 500cc prbc's ( 2 units)  Specimens: right ruptured fallopian tube secondary to isthmus ectopic  Urine output 400 cc clear  Complications:  none    Condition:  good        See dictated operative report for full details.      Ms. 2/8/2025 9:21 PM PROCEDURE NOTE  Date: 2/8/2025   Name: Miriam Rosa  YOB: 1990    Procedures

## 2025-02-09 NOTE — OP NOTE
was able to visualize a ruptured right ectopic tubal pregnancy.  I then went below and placed a Paulino cannula for uterine manipulation, changing gloves when I came back up to the laparoscopy.  I used a 5 mm LigaSure and performed a right salpingectomy.  I coagulated the mesosalpinx underneath the tube, again medial to this several times and cut, again using the 5 mm LigaSure.  This procedure was carried out into the tubal insertion into the uterus, which was coagulated several times and cut.  Again, the tubal pregnancy was in the isthmus segment of the fallopian tube.  Pictures were taken.  The specimen was placed in the anterior cul-de-sac.  I then switched to the 10 suction cannula and removed the hemoperitoneum again, I removed 700 mL of hemoperitoneum.  I irrigated it well.  I also took the patient out of Trendelenburg and reverse Trendelenburg to get some of the blood that was in the upper abdomen.  This was suctioned with the suction  and removed.  The patient was then placed in Trendelenburg position again.  I again irrigated, the surgical site was hemostatic.  I dropped the pressure to 0 and the surgical site was hemostatic.  Several pictures were taken.  As stated above, the left tube and ovary were normal, normal uterus, normal anterior and posterior cul-de-sac, normal right ovary.  The specimen was then placed in an EndoCatch bag and removed.  A 10 mm trocar was repositioned, I again inspected the surgical site and it was hemostatic.  The 10 mm trocar was removed and the fascia was closed with a Jai-Ned 0 Vicryl with good results.  No bleeding from this site.  The left lower trocar was removed under direct visualization, no bleeding.  Gas was removed.  Upper trocars were removed.  Skin incisions were closed with 4-0 Monocryl.  The Paulino cannula was removed.  There was a small amount of bleeding from the tenaculum site, which was stopped with a ring forceps.  I left a Sloan catheter in

## 2025-02-10 LAB
EKG ATRIAL RATE: 64 BPM
EKG P AXIS: 54 DEGREES
EKG P-R INTERVAL: 168 MS
EKG Q-T INTERVAL: 418 MS
EKG QRS DURATION: 76 MS
EKG QTC CALCULATION (BAZETT): 431 MS
EKG R AXIS: 60 DEGREES
EKG T AXIS: 49 DEGREES
EKG VENTRICULAR RATE: 64 BPM

## 2025-02-10 PROCEDURE — 93010 ELECTROCARDIOGRAM REPORT: CPT | Performed by: INTERNAL MEDICINE

## 2025-02-11 NOTE — DISCHARGE SUMMARY
[place] [address]                            DISCHARGE SUMMARY      PATIENT NAME: IGOR KENNY                 : 1990  MED REC NO: [id number]                        ROOM: [facility]  ACCOUNT NO: [id number]                       ADMIT DATE: 2025  PROVIDER: Brandon Mercado MD      The patient presented to the emergency room on 2025 with vaginal bleeding, pelvic pain.  Pregnancy test was positive.  Ultrasound showed no intrauterine pregnancy or complex for free fluid.  The patient had a syncopal episode in the emergency room.  The ER physician ordered 2 units of packed red blood cells.  The patient was taken to the OR for a laparoscopy for a suspected ruptured ectopic pregnancy.  There was a ruptured right isthmus, fallopian tube ectopic pregnancy. I performed a laparoscopic right salpingectomy, removal of hemoperitoneum.  There were no complications.  The patient did receive 2 units of packed red blood cells.  On the morning of 2025, the patient had no complaints.  Vital signs were stable.  Abdomen, normal bowel sounds.  Soft, nondistended, nontender.  Incisions were okay.  No calf pain.  Hemoglobin was stable.  The patient was discharged home with precautions, instructed to follow up with Dr. Cody.          BRANDON MERCADO MD      D:  02/10/2025 08:24:34     T:  2025 03:25:49     MPH/AQS  Job #:  [id number]     Doc#:  [id number]

## 2025-02-13 LAB — SURGICAL PATHOLOGY REPORT: NORMAL

## (undated) DEVICE — JELLY,LUBE,STERILE,FLIP TOP,TUBE,2-OZ: Brand: MEDLINE

## (undated) DEVICE — CATHETER,FOLEY,SILI-ELAST,LTX,16FR,10ML: Brand: MEDLINE

## (undated) DEVICE — SYRINGE, LUER LOCK, 10ML: Brand: MEDLINE

## (undated) DEVICE — LAPAROSCOPIC SCISSORS: Brand: EPIX LAPAROSCOPIC SCISSORS

## (undated) DEVICE — APPLICATOR MEDICATED 26 CC SOLUTION HI LT ORNG CHLORAPREP

## (undated) DEVICE — TOWEL,OR,DSP,ST,BLUE,STD,6/PK,12PK/CS: Brand: MEDLINE

## (undated) DEVICE — PACK,AURORA,LAVH: Brand: MEDLINE

## (undated) DEVICE — WARMER SCP 2 ANTIFOG LAP DISP

## (undated) DEVICE — Device

## (undated) DEVICE — GOWN,SIRUS,FABRNF,XL,20/CS: Brand: MEDLINE

## (undated) DEVICE — TROCAR: Brand: KII FIOS FIRST ENTRY

## (undated) DEVICE — ELECTRODE PT RET AD L9FT HI MOIST COND ADH HYDRGEL CORDED

## (undated) DEVICE — TROCAR: Brand: KII® SLEEVE

## (undated) DEVICE — MASTISOL ADHESIVE LIQ 2/3ML

## (undated) DEVICE — GLOVE SURG SZ 7 CRM LTX FREE POLYISOPRENE POLYMER BEAD ANTI

## (undated) DEVICE — PAD,SANITARY,11 IN,MAXI,N-STRL,IND WRAP: Brand: MEDLINE

## (undated) DEVICE — GLOVE SURG SZ 65 THK91MIL LTX FREE SYN POLYISOPRENE

## (undated) DEVICE — PAD PT POS 36 IN SURGYPAD DISP

## (undated) DEVICE — KIT,ANTI FOG,W/SPONGE & FLUID,SOFT PACK: Brand: MEDLINE

## (undated) DEVICE — VAGINAL PREP TRAY: Brand: MEDLINE INDUSTRIES, INC.

## (undated) DEVICE — DRAINBAG,ANTI-REFLUX TOWER,L/F,2000ML,LL: Brand: MEDLINE

## (undated) DEVICE — 4-PORT MANIFOLD: Brand: NEPTUNE 2

## (undated) DEVICE — DOUBLE BASIN SET: Brand: MEDLINE INDUSTRIES, INC.

## (undated) DEVICE — INSUFFLATION TUBING SET WITH FILTER, FUNNEL CONNECTOR AND LUER LOCK: Brand: JOSNOE MEDICAL INC

## (undated) DEVICE — GLOVE ORANGE PI 7 1/2   MSG9075

## (undated) DEVICE — BLADE,STAINLESS-STEEL,11,STRL,DISPOSABLE: Brand: MEDLINE